# Patient Record
Sex: MALE | Race: WHITE | NOT HISPANIC OR LATINO | URBAN - METROPOLITAN AREA
[De-identification: names, ages, dates, MRNs, and addresses within clinical notes are randomized per-mention and may not be internally consistent; named-entity substitution may affect disease eponyms.]

---

## 2017-12-14 ENCOUNTER — EMERGENCY (EMERGENCY)
Facility: HOSPITAL | Age: 67
LOS: 1 days | Discharge: HOME | End: 2017-12-14
Attending: EMERGENCY MEDICINE | Admitting: EMERGENCY MEDICINE
Payer: MEDICARE

## 2017-12-14 VITALS
OXYGEN SATURATION: 100 % | SYSTOLIC BLOOD PRESSURE: 108 MMHG | HEART RATE: 82 BPM | TEMPERATURE: 98 F | RESPIRATION RATE: 18 BRPM | DIASTOLIC BLOOD PRESSURE: 71 MMHG

## 2017-12-14 VITALS — DIASTOLIC BLOOD PRESSURE: 66 MMHG | HEART RATE: 79 BPM | SYSTOLIC BLOOD PRESSURE: 112 MMHG

## 2017-12-14 DIAGNOSIS — R10.13 EPIGASTRIC PAIN: ICD-10-CM

## 2017-12-14 DIAGNOSIS — Z88.0 ALLERGY STATUS TO PENICILLIN: ICD-10-CM

## 2017-12-14 DIAGNOSIS — R10.11 RIGHT UPPER QUADRANT PAIN: ICD-10-CM

## 2017-12-14 DIAGNOSIS — Z98.890 OTHER SPECIFIED POSTPROCEDURAL STATES: Chronic | ICD-10-CM

## 2017-12-14 DIAGNOSIS — R10.9 UNSPECIFIED ABDOMINAL PAIN: ICD-10-CM

## 2017-12-14 LAB
ALBUMIN SERPL ELPH-MCNC: 3.6 G/DL — SIGNIFICANT CHANGE UP (ref 3.4–5)
ALP SERPL-CCNC: 53 U/L — SIGNIFICANT CHANGE UP (ref 40–120)
ALT FLD-CCNC: 14 U/L — SIGNIFICANT CHANGE UP (ref 12–42)
ANION GAP SERPL CALC-SCNC: 6 MMOL/L — LOW (ref 9–16)
APPEARANCE UR: CLEAR — SIGNIFICANT CHANGE UP
AST SERPL-CCNC: 17 U/L — SIGNIFICANT CHANGE UP (ref 15–37)
BASOPHILS NFR BLD AUTO: 0.2 % — SIGNIFICANT CHANGE UP (ref 0–2)
BILIRUB SERPL-MCNC: 0.9 MG/DL — SIGNIFICANT CHANGE UP (ref 0.2–1.2)
BILIRUB UR-MCNC: NEGATIVE — SIGNIFICANT CHANGE UP
BUN SERPL-MCNC: 13 MG/DL — SIGNIFICANT CHANGE UP (ref 7–23)
CALCIUM SERPL-MCNC: 8.9 MG/DL — SIGNIFICANT CHANGE UP (ref 8.5–10.5)
CHLORIDE SERPL-SCNC: 103 MMOL/L — SIGNIFICANT CHANGE UP (ref 96–108)
CO2 SERPL-SCNC: 28 MMOL/L — SIGNIFICANT CHANGE UP (ref 22–31)
COLOR SPEC: YELLOW — SIGNIFICANT CHANGE UP
CREAT SERPL-MCNC: 0.63 MG/DL — SIGNIFICANT CHANGE UP (ref 0.5–1.3)
DIFF PNL FLD: NEGATIVE — SIGNIFICANT CHANGE UP
EOSINOPHIL NFR BLD AUTO: 0.4 % — SIGNIFICANT CHANGE UP (ref 0–6)
GLUCOSE SERPL-MCNC: 99 MG/DL — SIGNIFICANT CHANGE UP (ref 70–99)
GLUCOSE UR QL: NEGATIVE — SIGNIFICANT CHANGE UP
HCT VFR BLD CALC: 40.1 % — SIGNIFICANT CHANGE UP (ref 39–50)
HGB BLD-MCNC: 13.5 G/DL — SIGNIFICANT CHANGE UP (ref 13–17)
IMM GRANULOCYTES NFR BLD AUTO: 0.2 % — SIGNIFICANT CHANGE UP (ref 0–1.5)
KETONES UR-MCNC: 15 MG/DL
LEUKOCYTE ESTERASE UR-ACNC: NEGATIVE — SIGNIFICANT CHANGE UP
LIDOCAIN IGE QN: 138 U/L — SIGNIFICANT CHANGE UP (ref 73–393)
LYMPHOCYTES # BLD AUTO: 12.1 % — LOW (ref 13–44)
MCHC RBC-ENTMCNC: 30.1 PG — SIGNIFICANT CHANGE UP (ref 27–34)
MCHC RBC-ENTMCNC: 33.7 G/DL — SIGNIFICANT CHANGE UP (ref 32–36)
MCV RBC AUTO: 89.5 FL — SIGNIFICANT CHANGE UP (ref 80–100)
MONOCYTES NFR BLD AUTO: 6.8 % — SIGNIFICANT CHANGE UP (ref 2–14)
NEUTROPHILS NFR BLD AUTO: 80.3 % — HIGH (ref 43–77)
NITRITE UR-MCNC: NEGATIVE — SIGNIFICANT CHANGE UP
PH UR: 6 — SIGNIFICANT CHANGE UP (ref 5–8)
PLATELET # BLD AUTO: 123 K/UL — LOW (ref 150–400)
POTASSIUM SERPL-MCNC: 4.1 MMOL/L — SIGNIFICANT CHANGE UP (ref 3.5–5.3)
POTASSIUM SERPL-SCNC: 4.1 MMOL/L — SIGNIFICANT CHANGE UP (ref 3.5–5.3)
PROT SERPL-MCNC: 6.8 G/DL — SIGNIFICANT CHANGE UP (ref 6.4–8.2)
PROT UR-MCNC: NEGATIVE MG/DL — SIGNIFICANT CHANGE UP
RBC # BLD: 4.48 M/UL — SIGNIFICANT CHANGE UP (ref 4.2–5.8)
RBC # FLD: 13.8 % — SIGNIFICANT CHANGE UP (ref 10.3–16.9)
SODIUM SERPL-SCNC: 137 MMOL/L — SIGNIFICANT CHANGE UP (ref 132–145)
SP GR SPEC: 1.02 — SIGNIFICANT CHANGE UP (ref 1–1.03)
UROBILINOGEN FLD QL: 0.2 E.U./DL — SIGNIFICANT CHANGE UP
WBC # BLD: 8.2 K/UL — SIGNIFICANT CHANGE UP (ref 3.8–10.5)
WBC # FLD AUTO: 8.2 K/UL — SIGNIFICANT CHANGE UP (ref 3.8–10.5)

## 2017-12-14 PROCEDURE — 76700 US EXAM ABDOM COMPLETE: CPT | Mod: 26

## 2017-12-14 PROCEDURE — 99284 EMERGENCY DEPT VISIT MOD MDM: CPT | Mod: GC

## 2017-12-14 RX ORDER — MORPHINE SULFATE 50 MG/1
2 CAPSULE, EXTENDED RELEASE ORAL ONCE
Qty: 0 | Refills: 0 | Status: DISCONTINUED | OUTPATIENT
Start: 2017-12-14 | End: 2017-12-14

## 2017-12-14 RX ORDER — FAMOTIDINE 10 MG/ML
20 INJECTION INTRAVENOUS ONCE
Qty: 0 | Refills: 0 | Status: COMPLETED | OUTPATIENT
Start: 2017-12-14 | End: 2017-12-14

## 2017-12-14 RX ORDER — SODIUM CHLORIDE 9 MG/ML
1000 INJECTION INTRAMUSCULAR; INTRAVENOUS; SUBCUTANEOUS ONCE
Qty: 0 | Refills: 0 | Status: COMPLETED | OUTPATIENT
Start: 2017-12-14 | End: 2017-12-14

## 2017-12-14 RX ADMIN — Medication 30 MILLILITER(S): at 13:03

## 2017-12-14 RX ADMIN — SODIUM CHLORIDE 1000 MILLILITER(S): 9 INJECTION INTRAMUSCULAR; INTRAVENOUS; SUBCUTANEOUS at 13:03

## 2017-12-14 RX ADMIN — FAMOTIDINE 20 MILLIGRAM(S): 10 INJECTION INTRAVENOUS at 13:03

## 2017-12-14 RX ADMIN — MORPHINE SULFATE 2 MILLIGRAM(S): 50 CAPSULE, EXTENDED RELEASE ORAL at 13:53

## 2017-12-14 NOTE — ED ADULT NURSE NOTE - CHPI ED SYMPTOMS NEG
no diarrhea/no chills/no hematuria/no burning urination/no dysuria/no vomiting/no blood in stool/no abdominal distension/no fever

## 2017-12-14 NOTE — ED PROVIDER NOTE - ATTENDING CONTRIBUTION TO CARE
Pt is a 66yo M with a h/o chronic bacterial prostatitis, anxiety, and a PSH of b/l inguinal hernia repairs.  P/w epigastric pain since yesterday.  Pain is constant and denies any alleviating factors.  Similar in past but this is more severe.  Normal BM this morning and passing flatus.    Denies fever, n/v/d, dysuria, hematuria.  PE - agree with Resident exam as above.   A/P - Epigastric pain.  Will perform labs and US to r/o GB/pancreatitis pathology.  Declined pain medication.    W/U performed and benign.  Repeat abd exam again shows a soft abd with no TTP in lower quadrants.  No acute findings on w/u today.  Strict return precautions discussed and strict instructions to f/u with his PCP.

## 2017-12-14 NOTE — ED ADULT NURSE NOTE - OBJECTIVE STATEMENT
Patient is a 66 y/o male presenting to the ED for epigastric pain since yesterday night after drinking smoothie. Patient denies fever/chills, HA, CP, SOB, urinary/bowel s/s. Patient in NAD, resting comfortably, and will continue to monitor.

## 2017-12-14 NOTE — ED PROVIDER NOTE - MEDICAL DECISION MAKING DETAILS
66 yo man w/ upper abd pain, tender on examination. Concerning for cholecystitis vs pancreatitis. Labs, ua, sono. Pt declining pain medication at this time.

## 2017-12-14 NOTE — ED PROVIDER NOTE - CARE PLAN
Principal Discharge DX:	Abdominal pain  Instructions for follow-up, activity and diet:	Call your primary care doctor today or tomorrow to schedule follow up appointment for within the next 3-5 days.  Additionally, follow up with your gastroenterologist.  Continue taking your medications as prescribed.  Return to this Emergency Department if you experience worsening condition or for any other emergency.

## 2017-12-14 NOTE — ED PROVIDER NOTE - PROGRESS NOTE DETAILS
Aleksey Arguelles MD PGY4: Labs, imaging reviewed. Improved w/ medication. Repeat exam non tender. Will discharge with instructions for outpatient GI follow-up.

## 2017-12-14 NOTE — ED PROVIDER NOTE - PLAN OF CARE
Call your primary care doctor today or tomorrow to schedule follow up appointment for within the next 3-5 days.  Additionally, follow up with your gastroenterologist.  Continue taking your medications as prescribed.  Return to this Emergency Department if you experience worsening condition or for any other emergency.

## 2017-12-14 NOTE — ED PROVIDER NOTE - OBJECTIVE STATEMENT
68 yo man w/ h/o anxiety, chronic bacterial prostatitis, s/p b/l inguinal hernia repair p/w abd pain. Developed pain late yesterday evening, upper abd, ?radiation to back, constant, w/o clear aggravating/alleviating factors. Has had similar pain in past but never this severe. Normal BM this am, passing gas. Otherwise, denies fever, n/v/d, dysuria, hematuria.

## 2018-06-05 PROBLEM — F41.9 ANXIETY DISORDER, UNSPECIFIED: Chronic | Status: ACTIVE | Noted: 2017-12-14

## 2018-06-05 PROBLEM — N41.1 CHRONIC PROSTATITIS: Chronic | Status: ACTIVE | Noted: 2017-12-14

## 2018-06-05 PROBLEM — Z00.00 ENCOUNTER FOR PREVENTIVE HEALTH EXAMINATION: Status: ACTIVE | Noted: 2018-06-05

## 2018-08-08 ENCOUNTER — APPOINTMENT (OUTPATIENT)
Dept: OPHTHALMOLOGY | Facility: CLINIC | Age: 68
End: 2018-08-08
Payer: MEDICARE

## 2018-08-08 DIAGNOSIS — H43.813 VITREOUS DEGENERATION, BILATERAL: ICD-10-CM

## 2018-08-08 PROCEDURE — 92020 GONIOSCOPY: CPT

## 2018-08-08 PROCEDURE — 92225: CPT | Mod: LT

## 2018-08-08 PROCEDURE — 99204 OFFICE O/P NEW MOD 45 MIN: CPT

## 2018-08-08 PROCEDURE — 76514 ECHO EXAM OF EYE THICKNESS: CPT

## 2018-08-08 PROCEDURE — 92250 FUNDUS PHOTOGRAPHY W/I&R: CPT

## 2018-11-14 ENCOUNTER — APPOINTMENT (OUTPATIENT)
Dept: OPHTHALMOLOGY | Facility: CLINIC | Age: 68
End: 2018-11-14
Payer: MEDICARE

## 2018-11-14 DIAGNOSIS — H40.1231 LOW-TENSION GLAUCOMA, BILATERAL, MILD STAGE: ICD-10-CM

## 2018-11-14 PROCEDURE — 92083 EXTENDED VISUAL FIELD XM: CPT

## 2018-11-14 PROCEDURE — 92012 INTRM OPH EXAM EST PATIENT: CPT

## 2018-11-14 PROCEDURE — 92133 CPTRZD OPH DX IMG PST SGM ON: CPT

## 2018-12-01 ENCOUNTER — EMERGENCY (EMERGENCY)
Facility: HOSPITAL | Age: 68
LOS: 1 days | Discharge: HOME | End: 2018-12-01
Admitting: EMERGENCY MEDICINE
Payer: MEDICARE

## 2018-12-01 VITALS
DIASTOLIC BLOOD PRESSURE: 80 MMHG | HEART RATE: 69 BPM | SYSTOLIC BLOOD PRESSURE: 124 MMHG | RESPIRATION RATE: 20 BRPM | OXYGEN SATURATION: 95 %

## 2018-12-01 VITALS
OXYGEN SATURATION: 97 % | RESPIRATION RATE: 18 BRPM | DIASTOLIC BLOOD PRESSURE: 67 MMHG | HEART RATE: 64 BPM | SYSTOLIC BLOOD PRESSURE: 116 MMHG | TEMPERATURE: 97 F

## 2018-12-01 DIAGNOSIS — Z88.0 ALLERGY STATUS TO PENICILLIN: ICD-10-CM

## 2018-12-01 DIAGNOSIS — Z98.890 OTHER SPECIFIED POSTPROCEDURAL STATES: Chronic | ICD-10-CM

## 2018-12-01 DIAGNOSIS — R10.9 UNSPECIFIED ABDOMINAL PAIN: ICD-10-CM

## 2018-12-01 DIAGNOSIS — N20.0 CALCULUS OF KIDNEY: ICD-10-CM

## 2018-12-01 LAB
ALBUMIN SERPL ELPH-MCNC: 3.6 G/DL — SIGNIFICANT CHANGE UP (ref 3.4–5)
ALP SERPL-CCNC: 78 U/L — SIGNIFICANT CHANGE UP (ref 40–120)
ALT FLD-CCNC: 12 U/L — SIGNIFICANT CHANGE UP (ref 12–42)
ANION GAP SERPL CALC-SCNC: 3 MMOL/L — LOW (ref 9–16)
APPEARANCE UR: CLEAR — SIGNIFICANT CHANGE UP
AST SERPL-CCNC: 17 U/L — SIGNIFICANT CHANGE UP (ref 15–37)
BASOPHILS NFR BLD AUTO: 0.6 % — SIGNIFICANT CHANGE UP (ref 0–2)
BILIRUB SERPL-MCNC: 0.5 MG/DL — SIGNIFICANT CHANGE UP (ref 0.2–1.2)
BILIRUB UR-MCNC: NEGATIVE — SIGNIFICANT CHANGE UP
BUN SERPL-MCNC: 20 MG/DL — SIGNIFICANT CHANGE UP (ref 7–23)
CALCIUM SERPL-MCNC: 8.9 MG/DL — SIGNIFICANT CHANGE UP (ref 8.5–10.5)
CHLORIDE SERPL-SCNC: 106 MMOL/L — SIGNIFICANT CHANGE UP (ref 96–108)
CO2 SERPL-SCNC: 32 MMOL/L — HIGH (ref 22–31)
COLOR SPEC: YELLOW — SIGNIFICANT CHANGE UP
CREAT SERPL-MCNC: 0.96 MG/DL — SIGNIFICANT CHANGE UP (ref 0.5–1.3)
DIFF PNL FLD: ABNORMAL
EOSINOPHIL NFR BLD AUTO: 3.8 % — SIGNIFICANT CHANGE UP (ref 0–6)
GLUCOSE SERPL-MCNC: 115 MG/DL — HIGH (ref 70–99)
GLUCOSE UR QL: NEGATIVE — SIGNIFICANT CHANGE UP
HCT VFR BLD CALC: 40.2 % — SIGNIFICANT CHANGE UP (ref 39–50)
HGB BLD-MCNC: 13.2 G/DL — SIGNIFICANT CHANGE UP (ref 13–17)
IMM GRANULOCYTES NFR BLD AUTO: 0.2 % — SIGNIFICANT CHANGE UP (ref 0–1.5)
KETONES UR-MCNC: NEGATIVE — SIGNIFICANT CHANGE UP
LACTATE SERPL-SCNC: 1 MMOL/L — SIGNIFICANT CHANGE UP (ref 0.4–2)
LEUKOCYTE ESTERASE UR-ACNC: ABNORMAL
LIDOCAIN IGE QN: 199 U/L — SIGNIFICANT CHANGE UP (ref 73–393)
LYMPHOCYTES # BLD AUTO: 41.1 % — SIGNIFICANT CHANGE UP (ref 13–44)
MCHC RBC-ENTMCNC: 29.5 PG — SIGNIFICANT CHANGE UP (ref 27–34)
MCHC RBC-ENTMCNC: 32.8 G/DL — SIGNIFICANT CHANGE UP (ref 32–36)
MCV RBC AUTO: 89.9 FL — SIGNIFICANT CHANGE UP (ref 80–100)
MONOCYTES NFR BLD AUTO: 9.1 % — SIGNIFICANT CHANGE UP (ref 2–14)
NEUTROPHILS NFR BLD AUTO: 45.2 % — SIGNIFICANT CHANGE UP (ref 43–77)
NITRITE UR-MCNC: NEGATIVE — SIGNIFICANT CHANGE UP
PH UR: 6 — SIGNIFICANT CHANGE UP (ref 5–8)
PLATELET # BLD AUTO: 120 K/UL — LOW (ref 150–400)
POTASSIUM SERPL-MCNC: 3.8 MMOL/L — SIGNIFICANT CHANGE UP (ref 3.5–5.3)
POTASSIUM SERPL-SCNC: 3.8 MMOL/L — SIGNIFICANT CHANGE UP (ref 3.5–5.3)
PROT SERPL-MCNC: 6.9 G/DL — SIGNIFICANT CHANGE UP (ref 6.4–8.2)
PROT UR-MCNC: NEGATIVE MG/DL — SIGNIFICANT CHANGE UP
RBC # BLD: 4.47 M/UL — SIGNIFICANT CHANGE UP (ref 4.2–5.8)
RBC # FLD: 14.2 % — SIGNIFICANT CHANGE UP (ref 10.3–14.5)
SODIUM SERPL-SCNC: 141 MMOL/L — SIGNIFICANT CHANGE UP (ref 132–145)
SP GR SPEC: 1.02 — SIGNIFICANT CHANGE UP (ref 1–1.03)
TROPONIN I SERPL-MCNC: <0.017 NG/ML — LOW (ref 0.02–0.06)
UROBILINOGEN FLD QL: 0.2 E.U./DL — SIGNIFICANT CHANGE UP
WBC # BLD: 5.3 K/UL — SIGNIFICANT CHANGE UP (ref 3.8–10.5)
WBC # FLD AUTO: 5.3 K/UL — SIGNIFICANT CHANGE UP (ref 3.8–10.5)

## 2018-12-01 PROCEDURE — 74176 CT ABD & PELVIS W/O CONTRAST: CPT | Mod: 26

## 2018-12-01 PROCEDURE — 93010 ELECTROCARDIOGRAM REPORT: CPT

## 2018-12-01 PROCEDURE — 99284 EMERGENCY DEPT VISIT MOD MDM: CPT | Mod: 25

## 2018-12-01 RX ORDER — OXYCODONE AND ACETAMINOPHEN 5; 325 MG/1; MG/1
1 TABLET ORAL ONCE
Qty: 0 | Refills: 0 | Status: DISCONTINUED | OUTPATIENT
Start: 2018-12-01 | End: 2018-12-01

## 2018-12-01 RX ORDER — ONDANSETRON 8 MG/1
4 TABLET, FILM COATED ORAL ONCE
Qty: 0 | Refills: 0 | Status: COMPLETED | OUTPATIENT
Start: 2018-12-01 | End: 2018-12-01

## 2018-12-01 RX ORDER — SODIUM CHLORIDE 9 MG/ML
1000 INJECTION INTRAMUSCULAR; INTRAVENOUS; SUBCUTANEOUS ONCE
Qty: 0 | Refills: 0 | Status: COMPLETED | OUTPATIENT
Start: 2018-12-01 | End: 2018-12-01

## 2018-12-01 RX ORDER — CEFUROXIME AXETIL 250 MG
1 TABLET ORAL
Qty: 14 | Refills: 0
Start: 2018-12-01 | End: 2018-12-07

## 2018-12-01 RX ORDER — TAMSULOSIN HYDROCHLORIDE 0.4 MG/1
1 CAPSULE ORAL
Qty: 7 | Refills: 0 | OUTPATIENT
Start: 2018-12-01 | End: 2018-12-07

## 2018-12-01 RX ORDER — CEFTRIAXONE 500 MG/1
1 INJECTION, POWDER, FOR SOLUTION INTRAMUSCULAR; INTRAVENOUS ONCE
Qty: 0 | Refills: 0 | Status: COMPLETED | OUTPATIENT
Start: 2018-12-01 | End: 2018-12-01

## 2018-12-01 RX ORDER — ONDANSETRON 8 MG/1
1 TABLET, FILM COATED ORAL
Qty: 9 | Refills: 0
Start: 2018-12-01 | End: 2018-12-03

## 2018-12-01 RX ORDER — CEFUROXIME AXETIL 250 MG
1 TABLET ORAL
Qty: 14 | Refills: 0 | OUTPATIENT
Start: 2018-12-01 | End: 2018-12-07

## 2018-12-01 RX ORDER — MORPHINE SULFATE 50 MG/1
4 CAPSULE, EXTENDED RELEASE ORAL ONCE
Qty: 0 | Refills: 0 | Status: DISCONTINUED | OUTPATIENT
Start: 2018-12-01 | End: 2018-12-01

## 2018-12-01 RX ORDER — TAMSULOSIN HYDROCHLORIDE 0.4 MG/1
1 CAPSULE ORAL
Qty: 7 | Refills: 0
Start: 2018-12-01 | End: 2018-12-07

## 2018-12-01 RX ORDER — ONDANSETRON 8 MG/1
1 TABLET, FILM COATED ORAL
Qty: 9 | Refills: 0 | OUTPATIENT
Start: 2018-12-01 | End: 2018-12-03

## 2018-12-01 RX ORDER — TAMSULOSIN HYDROCHLORIDE 0.4 MG/1
0.4 CAPSULE ORAL AT BEDTIME
Qty: 0 | Refills: 0 | Status: DISCONTINUED | OUTPATIENT
Start: 2018-12-01 | End: 2018-12-05

## 2018-12-01 RX ORDER — KETOROLAC TROMETHAMINE 30 MG/ML
15 SYRINGE (ML) INJECTION ONCE
Qty: 0 | Refills: 0 | Status: DISCONTINUED | OUTPATIENT
Start: 2018-12-01 | End: 2018-12-01

## 2018-12-01 RX ADMIN — OXYCODONE AND ACETAMINOPHEN 1 TABLET(S): 5; 325 TABLET ORAL at 04:57

## 2018-12-01 RX ADMIN — TAMSULOSIN HYDROCHLORIDE 0.4 MILLIGRAM(S): 0.4 CAPSULE ORAL at 04:53

## 2018-12-01 RX ADMIN — SODIUM CHLORIDE 1000 MILLILITER(S): 9 INJECTION INTRAMUSCULAR; INTRAVENOUS; SUBCUTANEOUS at 02:27

## 2018-12-01 RX ADMIN — OXYCODONE AND ACETAMINOPHEN 1 TABLET(S): 5; 325 TABLET ORAL at 05:34

## 2018-12-01 RX ADMIN — MORPHINE SULFATE 4 MILLIGRAM(S): 50 CAPSULE, EXTENDED RELEASE ORAL at 02:27

## 2018-12-01 RX ADMIN — SODIUM CHLORIDE 1000 MILLILITER(S): 9 INJECTION INTRAMUSCULAR; INTRAVENOUS; SUBCUTANEOUS at 04:25

## 2018-12-01 RX ADMIN — CEFTRIAXONE 100 GRAM(S): 500 INJECTION, POWDER, FOR SOLUTION INTRAMUSCULAR; INTRAVENOUS at 04:53

## 2018-12-01 RX ADMIN — SODIUM CHLORIDE 1000 MILLILITER(S): 9 INJECTION INTRAMUSCULAR; INTRAVENOUS; SUBCUTANEOUS at 04:26

## 2018-12-01 RX ADMIN — ONDANSETRON 4 MILLIGRAM(S): 8 TABLET, FILM COATED ORAL at 02:27

## 2018-12-01 RX ADMIN — MORPHINE SULFATE 4 MILLIGRAM(S): 50 CAPSULE, EXTENDED RELEASE ORAL at 07:09

## 2018-12-01 RX ADMIN — Medication 15 MILLIGRAM(S): at 02:26

## 2018-12-01 NOTE — ED ADULT NURSE REASSESSMENT NOTE - NS ED NURSE REASSESS COMMENT FT1
Pt A&Ox3 at this time. Pt c.o 5/10 pain in right flank at this time. Pt pending medications at this time. PA notified of pain. Will continue to monitor. Pt received from ER RAYMUNDO Pang. Pt A&Ox3 at this time. Pt c.o 5/10 pain in right flank at this time. Pt pending medications at this time. PA notified of pain. Will continue to monitor.

## 2018-12-01 NOTE — ED PROVIDER NOTE - OBJECTIVE STATEMENT
68 year old male with h/o kidney stone, BPH, HLD presents with right flank pain x 3 hours. patient reports pain woke him from sleeping. associated with  mild nausea. no dysuria/hematuria noted.  no h/o similar symptoms in the past.   Denies fever, chills, V/D/C, melena, hematochezia, hematuria, change in urinary/bowel function, dysuria, d/c, flank pain, HA, dizziness, focal weakness, CP, SOB, palpitations, cough, and malaise.

## 2018-12-01 NOTE — ED PROVIDER NOTE - PROGRESS NOTE DETAILS
labs reviewed. prelim CT reviewed. 0.3cm stone noted. discussed with patient. will give NS #2 and flomax percocet for pain, patient reports did not alleviate pain. requesting morphine. discussed trying to find oral pain medication to dc patient on. patient reports he is not ready to leave yet. discussed reasons for urology admission which at this time he does not meet JENNIFER Taylor endorsed patient to day shift pending re-evaluation and disposition. patient signed out to me pending re-evaluation. Patient asking to be discharged, he states he wants to be in the comfort of his own home, pain controlled, tolerating PO, discussed CT findings/UA/lab results. will rx percocet for pain, patient states he is unable to take nsaids due to gastric ulcers in the past, patient aware that percocet can cause constipation, advised high fiber diet and metamucil/benefiber otc, advised hydration, rx ceftin (received IV ceftriaxone in ED), rx flomax, he has a urologist he will follow up with - advised f/u in 1-2 days, strict return precautions discussed including uncontrolled pain, fever, vomiting or any concerns, all questions answered, patient agrees with plan, will dc home with josé manuel.

## 2018-12-01 NOTE — ED PROVIDER NOTE - MEDICAL DECISION MAKING DETAILS
right flank pain with nausea. will get labs/UA/CT right flank pain with nausea. will get labs/UA/CT  likely stone

## 2018-12-01 NOTE — ED PROVIDER NOTE - NS ED ROS FT
· CONSTITUTIONAL: no fever and no chills.  · CARDIOVASCULAR: normal rate and rhythm, no chest pain and no edema.  · RESPIRATORY: no chest pain, no cough, and no shortness of breath.  · GASTROINTESTINAL: no abdominal pain, no bloating, no constipation, no diarrhea, no nausea and no vomiting. +right flank pain  · MUSCULOSKELETAL: no back pain, no musculoskeletal pain, no neck pain, and no weakness.  · SKIN: no abrasions, no jaundice, no lesions, no pruritis, and no rashes.  · NEURO: no loss of consciousness, no gait abnormality, no headache, no sensory deficits, and no weakness.  · PSYCHIATRIC: no known mental health issues.

## 2018-12-01 NOTE — ED ADULT TRIAGE NOTE - CHIEF COMPLAINT QUOTE
Patient ambulating with steady gait complaining of right sided flank/right lower quadrant pain that feels like a pulling sensation, woke patient out of his sleep around 12Am; patient denies any recent illness, nausea/vomiting/diarrhea, fevers/chills

## 2018-12-01 NOTE — ED PROVIDER NOTE - NSFOLLOWUPINSTRUCTIONS_ED_ALL_ED_FT
Please follow up with your urologist this week  If you want to see another urologist, please follow up with the above urologist  Drink plenty of fluids  Take Percocet as prescribed for pain  Take Flomax as prescribed  Take Zofran as needed or nausea.    RETURN TO THE EMERGENCY DEPARTMENT FOR WORSENING PAIN, FEVER, VOMITING, OR ANY CONCERNS.

## 2018-12-01 NOTE — ED PROVIDER NOTE - PHYSICAL EXAMINATION
VITAL SIGNS: I have reviewed nursing notes and confirm.  CONSTITUTIONAL: Well-developed; well-nourished; in no acute distress.  SKIN: Skin is warm and dry, no acute rash.  HEAD: Normocephalic; atraumatic.  EYES: PERRL, EOM intact; conjunctiva and sclera clear.  ENT: No nasal discharge; airway clear.  NECK: Supple; non tender.  CARD: S1, S2 normal; no murmurs, gallops, or rubs. Regular rate and rhythm.  RESP: No wheezes, rales or rhonchi.  ABD: Normal bowel sounds; soft; non-distended; non-tender;  no palpable or pulsating mass; no hepatosplenomegaly. no CVA tenderness  EXT: Normal ROM. No clubbing, cyanosis or edema.  NEURO: Alert, oriented. Grossly unremarkable.  PSYCH: Cooperative, appropriate.

## 2018-12-01 NOTE — ED PROVIDER NOTE - CARE PROVIDER_API CALL
Bebo Byrd (MD), Urology  [address], [place] [facility]  Phone: [phone]  Fax: [phone]

## 2018-12-02 LAB
CULTURE RESULTS: SIGNIFICANT CHANGE UP
SPECIMEN SOURCE: SIGNIFICANT CHANGE UP

## 2018-12-03 ENCOUNTER — APPOINTMENT (OUTPATIENT)
Dept: OTOLARYNGOLOGY | Facility: CLINIC | Age: 68
End: 2018-12-03
Payer: MEDICARE

## 2018-12-03 VITALS
WEIGHT: 140 LBS | OXYGEN SATURATION: 98 % | BODY MASS INDEX: 23.32 KG/M2 | HEIGHT: 65 IN | DIASTOLIC BLOOD PRESSURE: 61 MMHG | HEART RATE: 59 BPM | RESPIRATION RATE: 15 BRPM | SYSTOLIC BLOOD PRESSURE: 94 MMHG | TEMPERATURE: 97.7 F

## 2018-12-03 DIAGNOSIS — H25.813 COMBINED FORMS OF AGE-RELATED CATARACT, BILATERAL: ICD-10-CM

## 2018-12-03 DIAGNOSIS — G47.33 OBSTRUCTIVE SLEEP APNEA (ADULT) (PEDIATRIC): ICD-10-CM

## 2018-12-03 DIAGNOSIS — G47.19 OTHER HYPERSOMNIA: ICD-10-CM

## 2018-12-03 PROCEDURE — 31575 DIAGNOSTIC LARYNGOSCOPY: CPT

## 2018-12-03 PROCEDURE — 99203 OFFICE O/P NEW LOW 30 MIN: CPT | Mod: 25

## 2018-12-17 ENCOUNTER — APPOINTMENT (OUTPATIENT)
Dept: OPHTHALMOLOGY | Facility: CLINIC | Age: 68
End: 2018-12-17

## 2018-12-18 ENCOUNTER — INBOUND DOCUMENT (OUTPATIENT)
Age: 68
End: 2018-12-18

## 2019-01-07 ENCOUNTER — APPOINTMENT (OUTPATIENT)
Dept: OPHTHALMOLOGY | Facility: CLINIC | Age: 69
End: 2019-01-07

## 2019-02-14 ENCOUNTER — APPOINTMENT (OUTPATIENT)
Dept: OPHTHALMOLOGY | Facility: CLINIC | Age: 69
End: 2019-02-14

## 2019-08-09 ENCOUNTER — APPOINTMENT (OUTPATIENT)
Dept: UROLOGY | Facility: CLINIC | Age: 69
End: 2019-08-09
Payer: MEDICARE

## 2019-08-09 VITALS
SYSTOLIC BLOOD PRESSURE: 111 MMHG | HEIGHT: 65 IN | BODY MASS INDEX: 21.66 KG/M2 | TEMPERATURE: 97.7 F | DIASTOLIC BLOOD PRESSURE: 71 MMHG | HEART RATE: 85 BPM | OXYGEN SATURATION: 90 % | WEIGHT: 130 LBS

## 2019-08-09 DIAGNOSIS — Z78.9 OTHER SPECIFIED HEALTH STATUS: ICD-10-CM

## 2019-08-09 DIAGNOSIS — Z80.42 FAMILY HISTORY OF MALIGNANT NEOPLASM OF PROSTATE: ICD-10-CM

## 2019-08-09 PROCEDURE — 51798 US URINE CAPACITY MEASURE: CPT | Mod: 59

## 2019-08-09 PROCEDURE — 51741 ELECTRO-UROFLOWMETRY FIRST: CPT

## 2019-08-09 PROCEDURE — 99204 OFFICE O/P NEW MOD 45 MIN: CPT | Mod: 25

## 2019-08-09 NOTE — ASSESSMENT
[FreeTextEntry1] : 67yo male with BPH with incomplete bladder emptying\par Residuals are high > 300 \par Slow flow on Uroflow\par Likely prostate obstruction\par Will likely need bladder outlet procedure\par Reviewed types of procedures briefly\par Check UA, urine culture, PSA\par F/u for cystoscopy

## 2019-08-09 NOTE — PHYSICAL EXAM
[General Appearance - Well Developed] : well developed [General Appearance - Well Nourished] : well nourished [Normal Appearance] : normal appearance [Well Groomed] : well groomed [General Appearance - In No Acute Distress] : no acute distress [Edema] : no peripheral edema [Abdomen Tenderness] : non-tender [Abdomen Soft] : soft [Costovertebral Angle Tenderness] : no ~M costovertebral angle tenderness [Prostate Tenderness] : the prostate was not tender [Prostate Size ___ gm] : prostate size [unfilled] gm [No Prostate Nodules] : no prostate nodules [Normal Station and Gait] : the gait and station were normal for the patient's age [] : no rash [No Focal Deficits] : no focal deficits [Affect] : the affect was normal [Oriented To Time, Place, And Person] : oriented to person, place, and time [Not Anxious] : not anxious [Mood] : the mood was normal

## 2019-08-09 NOTE — LETTER BODY
[Courtesy Letter:] : I had the pleasure of seeing your patient, [unfilled], in my office today. [Dear  ___] : Dear  [unfilled], [Please see my note below.] : Please see my note below. [Consult Closing:] : Thank you very much for allowing me to participate in the care of this patient.  If you have any questions, please do not hesitate to contact me. [Sincerely,] : Sincerely, [FreeTextEntry2] : Harjit Restrepo MD\par [address]\[place] [address] [FreeTextEntry3] : Bebo Byrd MD\par Urologic Oncology\par Department of Urology\par [facility]\par \par [employer] [facility] \par \par

## 2019-08-09 NOTE — HISTORY OF PRESENT ILLNESS
[FreeTextEntry1] : This is a 67yo male here for initial consultation of BPH with obstruction. He has had this problem for many years, starting with episode of acute retention in 2004, 3 months after a hernia repair. He thinks his symptoms may be related to mesh. Also seen in ER 12/2018 for right 3mm distal ureteral stone which he thinks passed. At the time, he switched cardura for tamsulosin. H/o incomplete emptying with residuals around 300cc. Also with h/o recurrent Enteroccous infections. Currently without symptoms. CT in 12/2018 also shows bladder stone. Currently no dysuria, hematuria or flank pain. AUASS = 24. PSA around 2.5 last year. His brother had prostate cancer.  [Urinary Retention] : urinary retention [None] : None

## 2019-08-09 NOTE — REVIEW OF SYSTEMS
[Abdominal Pain] : abdominal pain [see HPI] : see HPI [Joint Pain] : joint pain [Anxiety] : anxiety [Negative] : Heme/Lymph

## 2019-08-12 LAB
APPEARANCE: ABNORMAL
BACTERIA UR CULT: NORMAL
BACTERIA: NEGATIVE
BILIRUBIN URINE: NEGATIVE
BLOOD URINE: NEGATIVE
CALCIUM OXALATE CRYSTALS: ABNORMAL
COLOR: ABNORMAL
GLUCOSE QUALITATIVE U: NEGATIVE
HYALINE CASTS: 1 /LPF
KETONES URINE: NEGATIVE
LEUKOCYTE ESTERASE URINE: ABNORMAL
MICROSCOPIC-UA: NORMAL
NITRITE URINE: NEGATIVE
PH URINE: 6.5
PROTEIN URINE: NORMAL
PSA SERPL-MCNC: 2.24 NG/ML
RED BLOOD CELLS URINE: 3 /HPF
SPECIFIC GRAVITY URINE: 1.02
SQUAMOUS EPITHELIAL CELLS: 0 /HPF
UROBILINOGEN URINE: NORMAL
WHITE BLOOD CELLS URINE: 124 /HPF

## 2019-08-13 ENCOUNTER — RESULT REVIEW (OUTPATIENT)
Age: 69
End: 2019-08-13

## 2019-08-26 ENCOUNTER — APPOINTMENT (OUTPATIENT)
Dept: UROLOGY | Facility: CLINIC | Age: 69
End: 2019-08-26
Payer: MEDICARE

## 2019-08-26 VITALS — TEMPERATURE: 97.6 F | SYSTOLIC BLOOD PRESSURE: 117 MMHG | DIASTOLIC BLOOD PRESSURE: 82 MMHG | HEART RATE: 104 BPM

## 2019-08-26 DIAGNOSIS — N21.0 CALCULUS IN BLADDER: ICD-10-CM

## 2019-08-26 PROCEDURE — 52000 CYSTOURETHROSCOPY: CPT

## 2019-08-26 RX ORDER — TAMSULOSIN HYDROCHLORIDE 0.4 MG/1
0.4 CAPSULE ORAL
Qty: 60 | Refills: 5 | Status: DISCONTINUED | COMMUNITY
Start: 1900-01-01 | End: 2019-08-26

## 2019-09-19 ENCOUNTER — MEDICATION RENEWAL (OUTPATIENT)
Age: 69
End: 2019-09-19

## 2019-10-04 ENCOUNTER — APPOINTMENT (OUTPATIENT)
Dept: UROLOGY | Facility: CLINIC | Age: 69
End: 2019-10-04
Payer: MEDICARE

## 2019-10-04 ENCOUNTER — LABORATORY RESULT (OUTPATIENT)
Age: 69
End: 2019-10-04

## 2019-10-04 VITALS
WEIGHT: 130 LBS | HEIGHT: 65 IN | BODY MASS INDEX: 21.66 KG/M2 | SYSTOLIC BLOOD PRESSURE: 112 MMHG | HEART RATE: 90 BPM | DIASTOLIC BLOOD PRESSURE: 82 MMHG | OXYGEN SATURATION: 100 %

## 2019-10-04 PROCEDURE — 51702 INSERT TEMP BLADDER CATH: CPT

## 2019-10-04 PROCEDURE — 99213 OFFICE O/P EST LOW 20 MIN: CPT | Mod: 25

## 2019-10-04 PROCEDURE — 51798 US URINE CAPACITY MEASURE: CPT

## 2019-10-05 NOTE — HISTORY OF PRESENT ILLNESS
[FreeTextEntry1] : This is a 67yo male here for initial consultation of BPH with obstruction. He has had this problem for many years, starting with episode of acute retention in 2004, 3 months after a hernia repair. He thinks his symptoms may be related to mesh. Also seen in ER 12/2018 for right 3mm distal ureteral stone which he thinks passed. At the time, he switched cardura for tamsulosin. H/o incomplete emptying with residuals around 300cc. Also with h/o recurrent Enteroccous infections. Currently without symptoms. CT in 12/2018 also shows bladder stone. Currently no dysuria, hematuria or flank pain. AUASS = 24. PSA around 2.5 last year. His brother had prostate cancer. \par \par 10/04/19 Here for f/u. C/o worsening retention symptoms, difficulty urinating. Went to outside ER where urine culture was reportedly normal, no other tests performed. He is concerned about developing obstructive uropathy. No recent creatinine available.  [None] : None [Urinary Retention] : urinary retention

## 2019-10-05 NOTE — PHYSICAL EXAM
[General Appearance - Well Developed] : well developed [Normal Appearance] : normal appearance [General Appearance - Well Nourished] : well nourished [General Appearance - In No Acute Distress] : no acute distress [Well Groomed] : well groomed [Costovertebral Angle Tenderness] : no ~M costovertebral angle tenderness [Abdomen Tenderness] : non-tender [Abdomen Soft] : soft [FreeTextEntry1] : PVR = 400cc. Deras catheter placed [Affect] : the affect was normal [Oriented To Time, Place, And Person] : oriented to person, place, and time [Mood] : the mood was normal [Not Anxious] : not anxious [Normal Station and Gait] : the gait and station were normal for the patient's age [No Focal Deficits] : no focal deficits

## 2019-10-05 NOTE — ASSESSMENT
[FreeTextEntry1] : 68yo male with BPH with incomplete bladder emptying\par Residuals are high > 300 \par Slow flow on Uroflow\par Cystoscopy revealed about 80 gm prostate\par mcclellan catheter placed today for urinary retention\par F/u next week for voiding trial\par He is interested in scheduling Greenlight PVP for prostate obstruction\par Will schedule for mid October

## 2019-10-05 NOTE — LETTER BODY
[Courtesy Letter:] : I had the pleasure of seeing your patient, [unfilled], in my office today. [Dear  ___] : Dear  [unfilled], [Please see my note below.] : Please see my note below. [FreeTextEntry2] : Harjit Restrepo MD\par [address]\[place] [address] [Consult Closing:] : Thank you very much for allowing me to participate in the care of this patient.  If you have any questions, please do not hesitate to contact me. [Sincerely,] : Sincerely, [FreeTextEntry3] : Bebo Byrd MD\par Urologic Oncology\par Department of Urology\par [facility]\par \par [employer] [facility] \par \par

## 2019-10-06 ENCOUNTER — FORM ENCOUNTER (OUTPATIENT)
Age: 69
End: 2019-10-06

## 2019-10-07 ENCOUNTER — APPOINTMENT (OUTPATIENT)
Dept: UROLOGY | Facility: CLINIC | Age: 69
End: 2019-10-07
Payer: MEDICARE

## 2019-10-07 ENCOUNTER — OUTPATIENT (OUTPATIENT)
Dept: OUTPATIENT SERVICES | Facility: HOSPITAL | Age: 69
LOS: 1 days | End: 2019-10-07
Payer: MEDICARE

## 2019-10-07 VITALS — SYSTOLIC BLOOD PRESSURE: 116 MMHG | TEMPERATURE: 97.6 F | DIASTOLIC BLOOD PRESSURE: 80 MMHG | HEART RATE: 106 BPM

## 2019-10-07 DIAGNOSIS — R33.9 RETENTION OF URINE, UNSPECIFIED: ICD-10-CM

## 2019-10-07 DIAGNOSIS — Z98.890 OTHER SPECIFIED POSTPROCEDURAL STATES: Chronic | ICD-10-CM

## 2019-10-07 LAB
ANION GAP SERPL CALC-SCNC: 12 MMOL/L
BACTERIA UR CULT: NORMAL
BUN SERPL-MCNC: 12 MG/DL
CALCIUM SERPL-MCNC: 8.7 MG/DL
CHLORIDE SERPL-SCNC: 97 MMOL/L
CO2 SERPL-SCNC: 27 MMOL/L
CREAT SERPL-MCNC: 0.77 MG/DL
GLUCOSE SERPL-MCNC: 111 MG/DL
POTASSIUM SERPL-SCNC: 4.4 MMOL/L
SODIUM SERPL-SCNC: 136 MMOL/L

## 2019-10-07 PROCEDURE — 71046 X-RAY EXAM CHEST 2 VIEWS: CPT | Mod: 26

## 2019-10-07 PROCEDURE — 51702 INSERT TEMP BLADDER CATH: CPT

## 2019-10-07 PROCEDURE — 71046 X-RAY EXAM CHEST 2 VIEWS: CPT

## 2019-10-07 PROCEDURE — 99214 OFFICE O/P EST MOD 30 MIN: CPT | Mod: 25

## 2019-10-07 RX ORDER — LEVOFLOXACIN 500 MG/1
500 TABLET, FILM COATED ORAL DAILY
Qty: 0 | Refills: 0 | Status: COMPLETED | OUTPATIENT
Start: 2019-10-07

## 2019-10-07 RX ADMIN — LEVOFLOXACIN 1 MG: 500 TABLET ORAL at 00:00

## 2019-10-08 LAB
ALBUMIN SERPL ELPH-MCNC: 4.2 G/DL
ALP BLD-CCNC: 79 U/L
ALT SERPL-CCNC: 11 U/L
ANION GAP SERPL CALC-SCNC: 12 MMOL/L
APPEARANCE: CLEAR
APTT BLD: 30.1 SEC
AST SERPL-CCNC: 21 U/L
BACTERIA: NEGATIVE
BASOPHILS # BLD AUTO: 0.01 K/UL
BASOPHILS NFR BLD AUTO: 0.2 %
BILIRUB SERPL-MCNC: 0.3 MG/DL
BILIRUBIN URINE: NEGATIVE
BLOOD URINE: NEGATIVE
BUN SERPL-MCNC: 13 MG/DL
CALCIUM SERPL-MCNC: 9 MG/DL
CHLORIDE SERPL-SCNC: 93 MMOL/L
CO2 SERPL-SCNC: 28 MMOL/L
COLOR: NORMAL
CREAT SERPL-MCNC: 0.78 MG/DL
EOSINOPHIL # BLD AUTO: 0.09 K/UL
EOSINOPHIL NFR BLD AUTO: 1.5 %
GLUCOSE QUALITATIVE U: NEGATIVE
GLUCOSE SERPL-MCNC: 112 MG/DL
HCT VFR BLD CALC: 44.4 %
HGB BLD-MCNC: 14.5 G/DL
HYALINE CASTS: 1 /LPF
IMM GRANULOCYTES NFR BLD AUTO: 0.3 %
INR PPP: 0.99 RATIO
KETONES URINE: NEGATIVE
LEUKOCYTE ESTERASE URINE: ABNORMAL
LYMPHOCYTES # BLD AUTO: 1.55 K/UL
LYMPHOCYTES NFR BLD AUTO: 25 %
MAN DIFF?: NORMAL
MCHC RBC-ENTMCNC: 30.1 PG
MCHC RBC-ENTMCNC: 32.7 GM/DL
MCV RBC AUTO: 92.1 FL
MICROSCOPIC-UA: NORMAL
MONOCYTES # BLD AUTO: 0.67 K/UL
MONOCYTES NFR BLD AUTO: 10.8 %
NEUTROPHILS # BLD AUTO: 3.85 K/UL
NEUTROPHILS NFR BLD AUTO: 62.2 %
NITRITE URINE: NEGATIVE
PH URINE: 6
PLATELET # BLD AUTO: 187 K/UL
POTASSIUM SERPL-SCNC: 4.8 MMOL/L
PROT SERPL-MCNC: 7 G/DL
PROTEIN URINE: NORMAL
PT BLD: 11.3 SEC
RBC # BLD: 4.82 M/UL
RBC # FLD: 13.9 %
RED BLOOD CELLS URINE: 3 /HPF
SODIUM SERPL-SCNC: 133 MMOL/L
SPECIFIC GRAVITY URINE: 1.01
SQUAMOUS EPITHELIAL CELLS: 2 /HPF
UROBILINOGEN URINE: NORMAL
WBC # FLD AUTO: 6.19 K/UL
WHITE BLOOD CELLS URINE: 7 /HPF

## 2019-10-08 NOTE — HISTORY OF PRESENT ILLNESS
[None] : None [FreeTextEntry1] : This is a 67yo male here for initial consultation of BPH with obstruction. He has had this problem for many years, starting with episode of acute retention in 2004, 3 months after a hernia repair. He thinks his symptoms may be related to mesh. Also seen in ER 12/2018 for right 3mm distal ureteral stone which he thinks passed. At the time, he switched cardura for tamsulosin. H/o incomplete emptying with residuals around 300cc. Also with h/o recurrent Enteroccous infections. Currently without symptoms. CT in 12/2018 also shows bladder stone. Currently no dysuria, hematuria or flank pain. AUASS = 24. PSA around 2.5 last year. His brother had prostate cancer. \par \par 10/04/19 Here for f/u. C/o worsening retention symptoms, difficulty urinating. Went to outside ER where urine culture was reportedly normal, no other tests performed. He is concerned about developing obstructive uropathy. No recent creatinine available. \par \par 10/7/19 Here for trial of void. Serum creatinine and urine culture were normal last week.  [Urinary Retention] : urinary retention [Edema] : ~T edema was not present [Flank Pain] : no flank pain [Abdominal Pain] : no abdominal pain [Fever] : no fever [Weight Loss] : no recent ~M [unfilled] weight loss [Fatigue] : no fatigue [Nausea] : no nausea [Anorexia] : no anorexia

## 2019-10-08 NOTE — PHYSICAL EXAM
[General Appearance - Well Developed] : well developed [General Appearance - Well Nourished] : well nourished [Normal Appearance] : normal appearance [Well Groomed] : well groomed [General Appearance - In No Acute Distress] : no acute distress [Urethral Meatus] : meatus normal [Penis Abnormality] : normal circumcised penis [Urinary Bladder Findings] : the bladder was normal on palpation [Scrotum] : the scrotum was normal [Abdomen Soft] : soft [Abdomen Tenderness] : non-tender [Costovertebral Angle Tenderness] : no ~M costovertebral angle tenderness [Affect] : the affect was normal [Oriented To Time, Place, And Person] : oriented to person, place, and time [FreeTextEntry1] : Failed TOV. Deras replaced [Mood] : the mood was normal [Not Anxious] : not anxious [Normal Station and Gait] : the gait and station were normal for the patient's age [No Focal Deficits] : no focal deficits

## 2019-10-08 NOTE — ASSESSMENT
[FreeTextEntry1] : 68yo male with BPH with incomplete bladder emptying\par Residuals are high > 300 \par Slow flow on Uroflow\par Cystoscopy revealed about 80 gm prostate\par mcclellan catheter replaced\par He is interested in scheduling Greenlight PVP for prostate obstruction\par Medical clearance\par

## 2019-10-08 NOTE — REVIEW OF SYSTEMS
[see HPI] : see HPI [Negative] : Psychiatric [Abdominal Pain] : abdominal pain [Fever] : no fever [Chills] : no chills

## 2019-10-08 NOTE — LETTER BODY
[Dear  ___] : Dear  [unfilled], [Courtesy Letter:] : I had the pleasure of seeing your patient, [unfilled], in my office today. [Please see my note below.] : Please see my note below. [Consult Closing:] : Thank you very much for allowing me to participate in the care of this patient.  If you have any questions, please do not hesitate to contact me. [FreeTextEntry2] : Harjit Restrepo MD\par [address]\[place] [address] [Sincerely,] : Sincerely, [FreeTextEntry3] : Bebo Byrd MD\par Urologic Oncology\par Department of Urology\par [facility]\par \par [employer] [facility] \par \par

## 2019-10-09 LAB — BACTERIA UR CULT: NORMAL

## 2019-10-10 ENCOUNTER — EMERGENCY (EMERGENCY)
Facility: HOSPITAL | Age: 69
LOS: 1 days | Discharge: HOME | End: 2019-10-10
Attending: EMERGENCY MEDICINE | Admitting: EMERGENCY MEDICINE
Payer: MEDICARE

## 2019-10-10 VITALS
HEART RATE: 88 BPM | WEIGHT: 125 LBS | DIASTOLIC BLOOD PRESSURE: 85 MMHG | SYSTOLIC BLOOD PRESSURE: 135 MMHG | HEIGHT: 64 IN | OXYGEN SATURATION: 97 % | RESPIRATION RATE: 16 BRPM | TEMPERATURE: 98 F

## 2019-10-10 DIAGNOSIS — Z98.890 OTHER SPECIFIED POSTPROCEDURAL STATES: Chronic | ICD-10-CM

## 2019-10-10 LAB
APPEARANCE UR: CLEAR — SIGNIFICANT CHANGE UP
BILIRUB UR-MCNC: NEGATIVE — SIGNIFICANT CHANGE UP
COLOR SPEC: YELLOW — SIGNIFICANT CHANGE UP
DIFF PNL FLD: ABNORMAL
GLUCOSE UR QL: NEGATIVE — SIGNIFICANT CHANGE UP
KETONES UR-MCNC: 15 MG/DL
LEUKOCYTE ESTERASE UR-ACNC: ABNORMAL
NITRITE UR-MCNC: NEGATIVE — SIGNIFICANT CHANGE UP
PH UR: 6 — SIGNIFICANT CHANGE UP (ref 5–8)
PROT UR-MCNC: 100 MG/DL
SP GR SPEC: >=1.03 — SIGNIFICANT CHANGE UP (ref 1–1.03)
UROBILINOGEN FLD QL: 0.2 E.U./DL — SIGNIFICANT CHANGE UP

## 2019-10-10 PROCEDURE — 99283 EMERGENCY DEPT VISIT LOW MDM: CPT

## 2019-10-10 RX ORDER — CEFUROXIME AXETIL 250 MG
1 TABLET ORAL
Qty: 14 | Refills: 0
Start: 2019-10-10 | End: 2019-10-16

## 2019-10-10 RX ORDER — PHENAZOPYRIDINE HCL 100 MG
200 TABLET ORAL ONCE
Refills: 0 | Status: COMPLETED | OUTPATIENT
Start: 2019-10-10 | End: 2019-10-10

## 2019-10-10 RX ORDER — PHENAZOPYRIDINE HCL 100 MG
1 TABLET ORAL
Qty: 9 | Refills: 0
Start: 2019-10-10 | End: 2019-10-12

## 2019-10-10 RX ORDER — IBUPROFEN 200 MG
600 TABLET ORAL ONCE
Refills: 0 | Status: COMPLETED | OUTPATIENT
Start: 2019-10-10 | End: 2019-10-10

## 2019-10-10 RX ADMIN — Medication 600 MILLIGRAM(S): at 02:02

## 2019-10-10 RX ADMIN — Medication 200 MILLIGRAM(S): at 02:02

## 2019-10-10 NOTE — ED PROVIDER NOTE - CARE PLAN
Principal Discharge DX:	Deras catheter problem, initial encounter  Secondary Diagnosis:	Urinary tract infection

## 2019-10-10 NOTE — ED PROVIDER NOTE - PATIENT PORTAL LINK FT
You can access the FollowMyHealth Patient Portal offered by [facility] by registering at the following website: [email or web address]. By joining [employer]’s FollowMyHealth portal, you will also be able to view your health information using other applications (apps) compatible with our system.

## 2019-10-10 NOTE — ED PROVIDER NOTE - CLINICAL SUMMARY MEDICAL DECISION MAKING FREE TEXT BOX
plan to start pyridium, patient to continue flomax, has no s/s of urosepsis, well appearing, and nontoxic, with functional and draining mcclellan catheter. UA and U Cx, patient declining cipro as he has chronic tendinitis, and will not take. will start on ceftin, and dc , has urology follow up.

## 2019-10-10 NOTE — ED PROVIDER NOTE - OBJECTIVE STATEMENT
70 yo M, hx of BPH and chronic urinary strictures, with indwelling mcclellan, which was placed with dr mitchell on friday, presents with suprapubic pressure and discomfort. per patient, dr mitchell attempted to remove mcclellan on monday, and failed voiding trial, and mcclellan was reinserted. supposed to see urology on friday in 2 days for possible removal. patient has elective almas laser for prostate scheduled in 2 weeks. denies fever, chills, flank pain, denies rectal pain or abd pain. notes small amount of blood in urine in mcclellan bag, but now resolved. denies obstructive urine symptoms. draining well otherwise. patient takes medicinal thc and took some prior to arrival to ED, notes increasing anxiety, about having indwelling mcclellan.

## 2019-10-10 NOTE — ED PROVIDER NOTE - CARE PROVIDER_API CALL
Bebo Byrd)  Urology  [address], [facility], [place] [address]  Phone: [phone]  Fax: [phone]  Follow Up Time:

## 2019-10-10 NOTE — ED ADULT TRIAGE NOTE - CHIEF COMPLAINT QUOTE
pt complains of suprapubic pain, states he had a mcclellan cath inserted Friday last week by his urologist. Pt states it's draining well, however feels a lot of pain. Appears anxious, took medical marijuana pta.

## 2019-10-11 ENCOUNTER — INBOUND DOCUMENT (OUTPATIENT)
Age: 69
End: 2019-10-11

## 2019-10-11 LAB
CULTURE RESULTS: NO GROWTH — SIGNIFICANT CHANGE UP
SPECIMEN SOURCE: SIGNIFICANT CHANGE UP

## 2019-10-15 DIAGNOSIS — Z88.0 ALLERGY STATUS TO PENICILLIN: ICD-10-CM

## 2019-10-15 DIAGNOSIS — T83.091A OTHER MECHANICAL COMPLICATION OF INDWELLING URETHRAL CATHETER, INITIAL ENCOUNTER: ICD-10-CM

## 2019-10-15 DIAGNOSIS — R10.30 LOWER ABDOMINAL PAIN, UNSPECIFIED: ICD-10-CM

## 2019-10-15 DIAGNOSIS — N39.0 URINARY TRACT INFECTION, SITE NOT SPECIFIED: ICD-10-CM
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
## 2019-10-16 VITALS
DIASTOLIC BLOOD PRESSURE: 93 MMHG | SYSTOLIC BLOOD PRESSURE: 140 MMHG | WEIGHT: 122.14 LBS | HEART RATE: 97 BPM | HEIGHT: 64 IN | OXYGEN SATURATION: 99 % | TEMPERATURE: 98 F | RESPIRATION RATE: 18 BRPM

## 2019-10-16 NOTE — ASU PATIENT PROFILE, ADULT - PMH
Anxiety    Asthma    BPH with obstruction/lower urinary tract symptoms    Chronic bacterial prostatitis    History of hepatitis B    History of kidney stones    HLD (hyperlipidemia)    MARCIA (obstructive sleep apnea)    Thrombocytopenia Anxiety    Asthma    BPH with obstruction/lower urinary tract symptoms    Chronic bacterial prostatitis    History of hepatitis B    History of kidney stones    HLD (hyperlipidemia)    MARCIA (obstructive sleep apnea)  mouth guard  Thrombocytopenia

## 2019-10-16 NOTE — ASU PATIENT PROFILE, ADULT - PSH
H/O wisdom tooth extraction    S/P bilateral inguinal hernia repair    S/P laser trabeculoplasty of eye  left x 2

## 2019-10-17 ENCOUNTER — APPOINTMENT (OUTPATIENT)
Dept: UROLOGY | Facility: HOSPITAL | Age: 69
End: 2019-10-17

## 2019-10-17 ENCOUNTER — OUTPATIENT (OUTPATIENT)
Dept: OUTPATIENT SERVICES | Facility: HOSPITAL | Age: 69
LOS: 1 days | Discharge: HOME | End: 2019-10-17
Payer: MEDICARE

## 2019-10-17 DIAGNOSIS — Z98.890 OTHER SPECIFIED POSTPROCEDURAL STATES: Chronic | ICD-10-CM

## 2019-10-17 DIAGNOSIS — N40.1 BENIGN PROSTATIC HYPERPLASIA WITH LOWER URINARY TRACT SYMPTOMS: ICD-10-CM

## 2019-10-17 DIAGNOSIS — K08.409 PARTIAL LOSS OF TEETH, UNSPECIFIED CAUSE, UNSPECIFIED CLASS: Chronic | ICD-10-CM

## 2019-10-17 PROCEDURE — 52648 LASER SURGERY OF PROSTATE: CPT

## 2019-10-17 RX ORDER — TAMSULOSIN HYDROCHLORIDE 0.4 MG/1
0.4 CAPSULE ORAL AT BEDTIME
Refills: 0 | Status: DISCONTINUED | OUTPATIENT
Start: 2019-10-17 | End: 2019-10-18

## 2019-10-17 RX ORDER — ALPRAZOLAM 0.25 MG
0.5 TABLET ORAL EVERY 12 HOURS
Refills: 0 | Status: DISCONTINUED | OUTPATIENT
Start: 2019-10-17 | End: 2019-10-17

## 2019-10-17 RX ORDER — CEFAZOLIN SODIUM 1 G
2000 VIAL (EA) INJECTION EVERY 8 HOURS
Refills: 0 | Status: COMPLETED | OUTPATIENT
Start: 2019-10-17 | End: 2019-10-18

## 2019-10-17 RX ORDER — OXYCODONE AND ACETAMINOPHEN 5; 325 MG/1; MG/1
2 TABLET ORAL EVERY 6 HOURS
Refills: 0 | Status: DISCONTINUED | OUTPATIENT
Start: 2019-10-17 | End: 2019-10-17

## 2019-10-17 RX ORDER — OXYCODONE AND ACETAMINOPHEN 5; 325 MG/1; MG/1
1 TABLET ORAL EVERY 4 HOURS
Refills: 0 | Status: DISCONTINUED | OUTPATIENT
Start: 2019-10-17 | End: 2019-10-17

## 2019-10-17 RX ORDER — LIDOCAINE 4 G/100G
1 CREAM TOPICAL
Refills: 0 | Status: DISCONTINUED | OUTPATIENT
Start: 2019-10-17 | End: 2019-10-18

## 2019-10-17 RX ORDER — DIAZEPAM 5 MG
5 TABLET ORAL EVERY 8 HOURS
Refills: 0 | Status: DISCONTINUED | OUTPATIENT
Start: 2019-10-17 | End: 2019-10-18

## 2019-10-17 RX ORDER — SODIUM CHLORIDE 9 MG/ML
1000 INJECTION, SOLUTION INTRAVENOUS
Refills: 0 | Status: DISCONTINUED | OUTPATIENT
Start: 2019-10-17 | End: 2019-10-18

## 2019-10-17 RX ORDER — DOCUSATE SODIUM 100 MG
100 CAPSULE ORAL THREE TIMES A DAY
Refills: 0 | Status: DISCONTINUED | OUTPATIENT
Start: 2019-10-17 | End: 2019-10-18

## 2019-10-17 RX ORDER — ATROPA BELLADONNA AND OPIUM 16.2; 6 MG/1; MG/1
1 SUPPOSITORY RECTAL ONCE
Refills: 0 | Status: DISCONTINUED | OUTPATIENT
Start: 2019-10-17 | End: 2019-10-17

## 2019-10-17 RX ORDER — OXYBUTYNIN CHLORIDE 5 MG
5 TABLET ORAL ONCE
Refills: 0 | Status: DISCONTINUED | OUTPATIENT
Start: 2019-10-17 | End: 2019-10-18

## 2019-10-17 RX ORDER — CEFAZOLIN SODIUM 1 G
2000 VIAL (EA) INJECTION EVERY 8 HOURS
Refills: 0 | Status: DISCONTINUED | OUTPATIENT
Start: 2019-10-17 | End: 2019-10-17

## 2019-10-17 RX ORDER — ACETAMINOPHEN 500 MG
650 TABLET ORAL EVERY 6 HOURS
Refills: 0 | Status: DISCONTINUED | OUTPATIENT
Start: 2019-10-17 | End: 2019-10-18

## 2019-10-17 RX ORDER — SENNA PLUS 8.6 MG/1
1 TABLET ORAL AT BEDTIME
Refills: 0 | Status: DISCONTINUED | OUTPATIENT
Start: 2019-10-17 | End: 2019-10-18

## 2019-10-17 RX ORDER — LANOLIN ALCOHOL/MO/W.PET/CERES
3 CREAM (GRAM) TOPICAL AT BEDTIME
Refills: 0 | Status: DISCONTINUED | OUTPATIENT
Start: 2019-10-17 | End: 2019-10-18

## 2019-10-17 RX ORDER — METOCLOPRAMIDE HCL 10 MG
10 TABLET ORAL EVERY 6 HOURS
Refills: 0 | Status: DISCONTINUED | OUTPATIENT
Start: 2019-10-17 | End: 2019-10-18

## 2019-10-17 RX ORDER — ONDANSETRON 8 MG/1
4 TABLET, FILM COATED ORAL EVERY 6 HOURS
Refills: 0 | Status: DISCONTINUED | OUTPATIENT
Start: 2019-10-17 | End: 2019-10-18

## 2019-10-17 RX ADMIN — Medication 5 MILLIGRAM(S): at 23:30

## 2019-10-17 RX ADMIN — ATROPA BELLADONNA AND OPIUM 1 SUPPOSITORY(S): 16.2; 6 SUPPOSITORY RECTAL at 16:30

## 2019-10-17 RX ADMIN — Medication 2000 MILLIGRAM(S): at 21:57

## 2019-10-17 RX ADMIN — SENNA PLUS 1 TABLET(S): 8.6 TABLET ORAL at 21:58

## 2019-10-17 RX ADMIN — ATROPA BELLADONNA AND OPIUM 1 SUPPOSITORY(S): 16.2; 6 SUPPOSITORY RECTAL at 16:55

## 2019-10-17 RX ADMIN — Medication 100 MILLIGRAM(S): at 21:58

## 2019-10-17 RX ADMIN — OXYCODONE AND ACETAMINOPHEN 1 TABLET(S): 5; 325 TABLET ORAL at 17:58

## 2019-10-17 RX ADMIN — TAMSULOSIN HYDROCHLORIDE 0.4 MILLIGRAM(S): 0.4 CAPSULE ORAL at 21:58

## 2019-10-18 ENCOUNTER — TRANSCRIPTION ENCOUNTER (OUTPATIENT)
Age: 69
End: 2019-10-18

## 2019-10-18 VITALS
HEART RATE: 71 BPM | RESPIRATION RATE: 18 BRPM | TEMPERATURE: 98 F | SYSTOLIC BLOOD PRESSURE: 145 MMHG | DIASTOLIC BLOOD PRESSURE: 92 MMHG | OXYGEN SATURATION: 99 %

## 2019-10-18 LAB
ANION GAP SERPL CALC-SCNC: 8 MMOL/L — SIGNIFICANT CHANGE UP (ref 5–17)
BUN SERPL-MCNC: 9 MG/DL — SIGNIFICANT CHANGE UP (ref 7–23)
CALCIUM SERPL-MCNC: 8.7 MG/DL — SIGNIFICANT CHANGE UP (ref 8.4–10.5)
CHLORIDE SERPL-SCNC: 100 MMOL/L — SIGNIFICANT CHANGE UP (ref 96–108)
CO2 SERPL-SCNC: 27 MMOL/L — SIGNIFICANT CHANGE UP (ref 22–31)
CREAT SERPL-MCNC: 0.77 MG/DL — SIGNIFICANT CHANGE UP (ref 0.5–1.3)
GLUCOSE SERPL-MCNC: 104 MG/DL — HIGH (ref 70–99)
HCT VFR BLD CALC: 36.4 % — LOW (ref 39–50)
HGB BLD-MCNC: 12 G/DL — LOW (ref 13–17)
MAGNESIUM SERPL-MCNC: 2 MG/DL — SIGNIFICANT CHANGE UP (ref 1.6–2.6)
MCHC RBC-ENTMCNC: 29.5 PG — SIGNIFICANT CHANGE UP (ref 27–34)
MCHC RBC-ENTMCNC: 33 GM/DL — SIGNIFICANT CHANGE UP (ref 32–36)
MCV RBC AUTO: 89.4 FL — SIGNIFICANT CHANGE UP (ref 80–100)
NRBC # BLD: 0 /100 WBCS — SIGNIFICANT CHANGE UP (ref 0–0)
PHOSPHATE SERPL-MCNC: 3.9 MG/DL — SIGNIFICANT CHANGE UP (ref 2.5–4.5)
PLATELET # BLD AUTO: 219 K/UL — SIGNIFICANT CHANGE UP (ref 150–400)
POTASSIUM SERPL-MCNC: 4.5 MMOL/L — SIGNIFICANT CHANGE UP (ref 3.5–5.3)
POTASSIUM SERPL-SCNC: 4.5 MMOL/L — SIGNIFICANT CHANGE UP (ref 3.5–5.3)
RBC # BLD: 4.07 M/UL — LOW (ref 4.2–5.8)
RBC # FLD: 12.8 % — SIGNIFICANT CHANGE UP (ref 10.3–14.5)
SODIUM SERPL-SCNC: 135 MMOL/L — SIGNIFICANT CHANGE UP (ref 135–145)
WBC # BLD: 10.53 K/UL — HIGH (ref 3.8–10.5)
WBC # FLD AUTO: 10.53 K/UL — HIGH (ref 3.8–10.5)

## 2019-10-18 PROCEDURE — 86901 BLOOD TYPING SEROLOGIC RH(D): CPT

## 2019-10-18 PROCEDURE — C1889: CPT

## 2019-10-18 PROCEDURE — 86900 BLOOD TYPING SEROLOGIC ABO: CPT

## 2019-10-18 PROCEDURE — 86850 RBC ANTIBODY SCREEN: CPT

## 2019-10-18 PROCEDURE — 83735 ASSAY OF MAGNESIUM: CPT

## 2019-10-18 PROCEDURE — 84100 ASSAY OF PHOSPHORUS: CPT

## 2019-10-18 PROCEDURE — 52648 LASER SURGERY OF PROSTATE: CPT

## 2019-10-18 PROCEDURE — 36415 COLL VENOUS BLD VENIPUNCTURE: CPT

## 2019-10-18 PROCEDURE — 87086 URINE CULTURE/COLONY COUNT: CPT

## 2019-10-18 PROCEDURE — 85027 COMPLETE CBC AUTOMATED: CPT

## 2019-10-18 PROCEDURE — 80048 BASIC METABOLIC PNL TOTAL CA: CPT

## 2019-10-18 RX ORDER — SODIUM CHLORIDE 9 MG/ML
3 INJECTION INTRAMUSCULAR; INTRAVENOUS; SUBCUTANEOUS EVERY 8 HOURS
Refills: 0 | Status: DISCONTINUED | OUTPATIENT
Start: 2019-10-18 | End: 2019-10-18

## 2019-10-18 RX ADMIN — Medication 100 MILLIGRAM(S): at 14:06

## 2019-10-18 RX ADMIN — Medication 3 MILLIGRAM(S): at 03:21

## 2019-10-18 RX ADMIN — Medication 100 MILLIGRAM(S): at 05:29

## 2019-10-18 RX ADMIN — Medication 2000 MILLIGRAM(S): at 05:29

## 2019-10-18 RX ADMIN — Medication 2000 MILLIGRAM(S): at 14:06

## 2019-10-18 RX ADMIN — SODIUM CHLORIDE 3 MILLILITER(S): 9 INJECTION INTRAMUSCULAR; INTRAVENOUS; SUBCUTANEOUS at 14:06

## 2019-10-18 NOTE — PROGRESS NOTE ADULT - PROBLEM SELECTOR PLAN 1
-stable  -OOB  -SCD's, IS  -f/u labs  -d/c CBI  -possible TOV this am and d/c home  -continue present care
-stable  -OOB  -IS, SCD's  -Diet: regular  -Antibx: cafazolin x 3  -I's & O's  -pain control  -IVF's  -continue CBI/FC  -possible TOV in am

## 2019-10-18 NOTE — DISCHARGE NOTE NURSING/CASE MANAGEMENT/SOCIAL WORK - PATIENT PORTAL LINK FT
You can access the FollowMyHealth Patient Portal offered by [facility] by registering at the following website: [email or web address]. By joining [employer]’s FollowMyHealth portal, you will also be able to view your health information using other applications (apps) compatible with our system.

## 2019-10-18 NOTE — DISCHARGE NOTE PROVIDER - NSDCCPCAREPLAN_GEN_ALL_CORE_FT
PRINCIPAL DISCHARGE DIAGNOSIS  Diagnosis: BPH with obstruction/lower urinary tract symptoms  Assessment and Plan of Treatment: BPH with obstruction/lower urinary tract symptoms

## 2019-10-18 NOTE — DISCHARGE NOTE PROVIDER - HOSPITAL COURSE
69M here for BPH s/p TURP 10/17. He passed his TOV today with minimal PVR. He tolerated the procedure well. VSS, afebrile, ambulatory and hemodynamically stable.

## 2019-10-18 NOTE — PROGRESS NOTE ADULT - SUBJECTIVE AND OBJECTIVE BOX
INTERVAL HPI/OVERNIGHT EVENTS:  No acute events overnight.    VITALS:    T(F): 98.3 (10-18-19 @ 05:38), Max: 98.3 (10-18-19 @ 05:38)  HR: 78 (10-18-19 @ 05:38) (64 - 78)  BP: 126/77 (10-18-19 @ 05:38) (104/79 - 134/87)  RR: 17 (10-18-19 @ 05:38) (14 - 26)  SpO2: 96% (10-18-19 @ 05:38) (94% - 99%)  Wt(kg): --    I&O's Detail    17 Oct 2019 07:01  -  18 Oct 2019 06:24  --------------------------------------------------------  IN:    Continuous Bladder Irrigation: 2750 mL    lactated ringers.: 300 mL  Total IN: 3050 mL    OUT:    Continuous Bladder Irrigation: 9200 mL  Total OUT: 9200 mL    Total NET: -6150 mL          MEDICATIONS:    ANTIBIOTICS:  ceFAZolin  Injectable. 2000 milliGRAM(s) IV Push every 8 hours      PAIN CONTROL:  acetaminophen   Tablet .. 650 milliGRAM(s) Oral every 6 hours PRN  diazepam    Tablet 5 milliGRAM(s) Oral every 8 hours PRN  melatonin 3 milliGRAM(s) Oral at bedtime PRN  metoclopramide Injectable 10 milliGRAM(s) IV Push every 6 hours PRN  ondansetron Injectable 4 milliGRAM(s) IV Push every 6 hours PRN       MEDS:  oxybutynin 5 milliGRAM(s) Oral once PRN      HEME/ONC        PHYSICAL EXAM:  General: No acute distress.  Alert and Oriented  Abdominal Exam: soft, NT, ND   Exam: CBI/FC intact, urine clear      LABS:                RADIOLOGY & ADDITIONAL TESTS:
UROLOGY POST OP NOTE (PAGER # [phone])    PROCEDURE: cysto, TURP    T(C): 36.6 (10-17-19 @ 18:30), Max: 36.6 (10-17-19 @ 18:30)  HR: 72 (10-17-19 @ 19:30) (64 - 72)  BP: 119/69 (10-17-19 @ 19:30) (119/69 - 134/87)  RR: 20 (10-17-19 @ 19:30) (14 - 26)  SpO2: 98% (10-17-19 @ 19:30) (97% - 99%)  Wt(kg): --  UO: CBI    SUBJECTIVE: pain controlled. No N/V. No CP/SOB.    ON PE: alert and awake    Abdomen: soft, NT, ND    : CBI/FC intact, urine clear

## 2019-10-18 NOTE — DISCHARGE NOTE PROVIDER - NSDCFUADDINST_GEN_ALL_CORE_FT
Stay well hydrated, May shower. No strenuous activity until you speak with your Surgeon. Call  with fever >101, questions and to set up your follow up appointment  Do not drive, operate machinery while taking pain     Blood in your urine. It's normal to see blood right after surgery. Urination might be painful. This is normal and will improve Stay well hydrated, May shower. No strenuous activity until you speak with your Surgeon. Call  with fever >101, questions and to set up your follow up appointment  Do not drive, operate machinery while taking pain     Blood in your urine. It's normal to see blood right after surgery. Urination might be painful. This is normal and will improve.  Continue Taking your Flomax

## 2019-10-20 ENCOUNTER — EMERGENCY (EMERGENCY)
Facility: HOSPITAL | Age: 69
LOS: 1 days | Discharge: HOME | End: 2019-10-20
Attending: EMERGENCY MEDICINE | Admitting: EMERGENCY MEDICINE
Payer: MEDICARE

## 2019-10-20 VITALS
WEIGHT: 121.92 LBS | DIASTOLIC BLOOD PRESSURE: 99 MMHG | SYSTOLIC BLOOD PRESSURE: 145 MMHG | TEMPERATURE: 98 F | RESPIRATION RATE: 16 BRPM | OXYGEN SATURATION: 97 % | HEART RATE: 100 BPM

## 2019-10-20 VITALS
HEART RATE: 83 BPM | DIASTOLIC BLOOD PRESSURE: 78 MMHG | OXYGEN SATURATION: 99 % | RESPIRATION RATE: 16 BRPM | TEMPERATURE: 98 F | SYSTOLIC BLOOD PRESSURE: 118 MMHG

## 2019-10-20 DIAGNOSIS — R10.30 LOWER ABDOMINAL PAIN, UNSPECIFIED: ICD-10-CM

## 2019-10-20 DIAGNOSIS — K08.409 PARTIAL LOSS OF TEETH, UNSPECIFIED CAUSE, UNSPECIFIED CLASS: Chronic | ICD-10-CM

## 2019-10-20 DIAGNOSIS — N39.0 URINARY TRACT INFECTION, SITE NOT SPECIFIED: ICD-10-CM

## 2019-10-20 DIAGNOSIS — Z98.890 OTHER SPECIFIED POSTPROCEDURAL STATES: Chronic | ICD-10-CM

## 2019-10-20 PROBLEM — E78.5 HYPERLIPIDEMIA, UNSPECIFIED: Chronic | Status: ACTIVE | Noted: 2019-10-16

## 2019-10-20 PROBLEM — Z87.442 PERSONAL HISTORY OF URINARY CALCULI: Chronic | Status: ACTIVE | Noted: 2019-10-16

## 2019-10-20 PROBLEM — Z86.19 PERSONAL HISTORY OF OTHER INFECTIOUS AND PARASITIC DISEASES: Chronic | Status: ACTIVE | Noted: 2019-10-16

## 2019-10-20 PROBLEM — J45.909 UNSPECIFIED ASTHMA, UNCOMPLICATED: Chronic | Status: ACTIVE | Noted: 2019-10-16

## 2019-10-20 PROBLEM — D69.6 THROMBOCYTOPENIA, UNSPECIFIED: Chronic | Status: ACTIVE | Noted: 2019-10-16

## 2019-10-20 PROBLEM — G47.33 OBSTRUCTIVE SLEEP APNEA (ADULT) (PEDIATRIC): Chronic | Status: ACTIVE | Noted: 2019-10-16

## 2019-10-20 PROBLEM — N40.1 BENIGN PROSTATIC HYPERPLASIA WITH LOWER URINARY TRACT SYMPTOMS: Chronic | Status: ACTIVE | Noted: 2019-10-16

## 2019-10-20 LAB
ALBUMIN SERPL ELPH-MCNC: 3.3 G/DL — LOW (ref 3.4–5)
ALP SERPL-CCNC: 70 U/L — SIGNIFICANT CHANGE UP (ref 40–120)
ALT FLD-CCNC: 12 U/L — SIGNIFICANT CHANGE UP (ref 12–42)
ANION GAP SERPL CALC-SCNC: 8 MMOL/L — LOW (ref 9–16)
APPEARANCE UR: CLEAR — SIGNIFICANT CHANGE UP
AST SERPL-CCNC: 20 U/L — SIGNIFICANT CHANGE UP (ref 15–37)
BASOPHILS NFR BLD AUTO: 0.1 % — SIGNIFICANT CHANGE UP (ref 0–2)
BILIRUB SERPL-MCNC: 0.8 MG/DL — SIGNIFICANT CHANGE UP (ref 0.2–1.2)
BILIRUB UR-MCNC: ABNORMAL
BUN SERPL-MCNC: 13 MG/DL — SIGNIFICANT CHANGE UP (ref 7–23)
CALCIUM SERPL-MCNC: 8.6 MG/DL — SIGNIFICANT CHANGE UP (ref 8.5–10.5)
CHLORIDE SERPL-SCNC: 93 MMOL/L — LOW (ref 96–108)
CK SERPL-CCNC: 69 U/L — SIGNIFICANT CHANGE UP (ref 39–308)
CO2 SERPL-SCNC: 30 MMOL/L — SIGNIFICANT CHANGE UP (ref 22–31)
COLOR SPEC: ABNORMAL
CREAT SERPL-MCNC: 0.66 MG/DL — SIGNIFICANT CHANGE UP (ref 0.5–1.3)
CULTURE RESULTS: NO GROWTH — SIGNIFICANT CHANGE UP
DIFF PNL FLD: ABNORMAL
EOSINOPHIL NFR BLD AUTO: 1.1 % — SIGNIFICANT CHANGE UP (ref 0–6)
EXTRA BLUE TOP TUBE: SIGNIFICANT CHANGE UP
EXTRA GREEN TOP TUBE: SIGNIFICANT CHANGE UP
EXTRA LAVENDER TOP TUBE: SIGNIFICANT CHANGE UP
GLUCOSE SERPL-MCNC: 110 MG/DL — HIGH (ref 70–99)
GLUCOSE UR QL: NEGATIVE — SIGNIFICANT CHANGE UP
HCT VFR BLD CALC: 32.7 % — LOW (ref 39–50)
HGB BLD-MCNC: 11.4 G/DL — LOW (ref 13–17)
IMM GRANULOCYTES NFR BLD AUTO: 0.6 % — SIGNIFICANT CHANGE UP (ref 0–1.5)
KETONES UR-MCNC: ABNORMAL MG/DL
LEUKOCYTE ESTERASE UR-ACNC: ABNORMAL
LYMPHOCYTES # BLD AUTO: 10.7 % — LOW (ref 13–44)
MCHC RBC-ENTMCNC: 30.2 PG — SIGNIFICANT CHANGE UP (ref 27–34)
MCHC RBC-ENTMCNC: 34.9 G/DL — SIGNIFICANT CHANGE UP (ref 32–36)
MCV RBC AUTO: 86.7 FL — SIGNIFICANT CHANGE UP (ref 80–100)
MONOCYTES NFR BLD AUTO: 9.7 % — SIGNIFICANT CHANGE UP (ref 2–14)
NEUTROPHILS NFR BLD AUTO: 77.8 % — HIGH (ref 43–77)
NITRITE UR-MCNC: POSITIVE
PH UR: 6.5 — SIGNIFICANT CHANGE UP (ref 5–8)
PLATELET # BLD AUTO: 205 K/UL — SIGNIFICANT CHANGE UP (ref 150–400)
POTASSIUM SERPL-MCNC: 4 MMOL/L — SIGNIFICANT CHANGE UP (ref 3.5–5.3)
POTASSIUM SERPL-SCNC: 4 MMOL/L — SIGNIFICANT CHANGE UP (ref 3.5–5.3)
PROT SERPL-MCNC: 6.8 G/DL — SIGNIFICANT CHANGE UP (ref 6.4–8.2)
PROT UR-MCNC: >=300 MG/DL
RBC # BLD: 3.77 M/UL — LOW (ref 4.2–5.8)
RBC # FLD: 13.2 % — SIGNIFICANT CHANGE UP (ref 10.3–14.5)
SODIUM SERPL-SCNC: 131 MMOL/L — LOW (ref 132–145)
SP GR SPEC: 1.01 — SIGNIFICANT CHANGE UP (ref 1–1.03)
SPECIMEN SOURCE: SIGNIFICANT CHANGE UP
UROBILINOGEN FLD QL: 1 E.U./DL — SIGNIFICANT CHANGE UP
WBC # BLD: 8.7 K/UL — SIGNIFICANT CHANGE UP (ref 3.8–10.5)
WBC # FLD AUTO: 8.7 K/UL — SIGNIFICANT CHANGE UP (ref 3.8–10.5)

## 2019-10-20 PROCEDURE — 99284 EMERGENCY DEPT VISIT MOD MDM: CPT

## 2019-10-20 PROCEDURE — 74176 CT ABD & PELVIS W/O CONTRAST: CPT | Mod: 26

## 2019-10-20 RX ORDER — CEFTRIAXONE 500 MG/1
1000 INJECTION, POWDER, FOR SOLUTION INTRAMUSCULAR; INTRAVENOUS ONCE
Refills: 0 | Status: COMPLETED | OUTPATIENT
Start: 2019-10-20 | End: 2019-10-20

## 2019-10-20 RX ORDER — AZTREONAM 2 G
1 VIAL (EA) INJECTION
Qty: 20 | Refills: 0
Start: 2019-10-20 | End: 2019-10-29

## 2019-10-20 RX ADMIN — CEFTRIAXONE 100 MILLIGRAM(S): 500 INJECTION, POWDER, FOR SOLUTION INTRAMUSCULAR; INTRAVENOUS at 14:02

## 2019-10-20 NOTE — ED PROVIDER NOTE - CLINICAL SUMMARY MEDICAL DECISION MAKING FREE TEXT BOX
The patient's symptoms progressively improved throughout the ED stay.  ED evaluation and management discussed with the patient and family (if available) in detail.  Close PMD and/or specialist follow up encouraged.  Strict ED return instructions discussed in detail for any worsening or new symptoms. The patient was given the opportunity to ask any questions about their discharge diagnosis and discharge instructions.  The patient verbalized understanding of these instructions and need to return to the ED for any worsening of illness or for any concern. The patient understands Emergency Department diagnosis is a preliminary diagnosis often based on limited information and that the patient must adhere to the follow-up plan as discussed. The patient tolerated PO fluids.  At the time of discharge from the Emergency Department, the patient is alert with fluent appropriate speech and ambulatory without difficulty.  A medical screening examination was performed and no emergency medical condition was identified.    urine has cleared with irrigation - no clots

## 2019-10-20 NOTE — ED PROVIDER NOTE - OBJECTIVE STATEMENT
69 yo male with complaint of urinary rentention x 1 day - 3 days s/p green light therapy procedure w dr. Bragg of urology.   c/o of lower anterior pelvic pain and fullness     no fever no flank pain no cp no sob no abd pain

## 2019-10-20 NOTE — ED ADULT NURSE NOTE - OBJECTIVE STATEMENT
Spasming pain in suprapubic region after "Greenlight laser" surgery by MD Goldsmith 10/17. Patient states pain acute onset 0330 this morning, denies nausea, vomiting and diarrhea, passing stool this morning and urinating with clots over last 2 days.

## 2019-10-20 NOTE — ED PROVIDER NOTE - NSFOLLOWUPINSTRUCTIONS_ED_ALL_ED_FT
take medications as directed     follow up with dr mitchell    return to ER for worsening of symptoms or any other concern

## 2019-10-20 NOTE — ED PROVIDER NOTE - PATIENT PORTAL LINK FT
You can access the FollowMyHealth Patient Portal offered by [facility] by registering at the following website: [email or web address]. By joining [facility]’s FollowMyHealth portal, you will also be able to view your health information using other applications (apps) compatible with our system.

## 2019-10-23 ENCOUNTER — APPOINTMENT (OUTPATIENT)
Dept: UROLOGY | Facility: CLINIC | Age: 69
End: 2019-10-23
Payer: MEDICARE

## 2019-10-23 VITALS — DIASTOLIC BLOOD PRESSURE: 71 MMHG | TEMPERATURE: 97.6 F | HEART RATE: 98 BPM | SYSTOLIC BLOOD PRESSURE: 113 MMHG

## 2019-10-23 PROCEDURE — 99024 POSTOP FOLLOW-UP VISIT: CPT

## 2019-10-23 NOTE — ASSESSMENT
[FreeTextEntry1] : 70yo male with BPH with incomplete bladder emptying\par Residuals are high > 300 \par Slow flow on Uroflow\par Cystoscopy revealed about 80 gm prostate\par s/p Greenlight PVP 10/17/19\par Passed TOV today with PVR < 100cc\par Continue tamsulosin\par F/u 1 month

## 2019-10-23 NOTE — HISTORY OF PRESENT ILLNESS
[Urinary Retention] : urinary retention [None] : None [FreeTextEntry1] : This is a 69yo male here for initial consultation of BPH with obstruction. He has had this problem for many years, starting with episode of acute retention in 2004, 3 months after a hernia repair. He thinks his symptoms may be related to mesh. Also seen in ER 12/2018 for right 3mm distal ureteral stone which he thinks passed. At the time, he switched cardura for tamsulosin. H/o incomplete emptying with residuals around 300cc. Also with h/o recurrent Enteroccous infections. Currently without symptoms. CT in 12/2018 also shows bladder stone. Currently no dysuria, hematuria or flank pain. AUASS = 24. PSA around 2.5 last year. His brother had prostate cancer. \par \par 10/04/19 Here for f/u. C/o worsening retention symptoms, difficulty urinating. Went to outside ER where urine culture was reportedly normal, no other tests performed. He is concerned about developing obstructive uropathy. No recent creatinine available. \par \par 10/23/19 Here for trial of void. Underwent Greenlight PVP last week. Discharged home without Deras but Deras replaced in ER. Also underwent CT scan which shows resolution of small distal ureteral stone. Urine now clear.  [Hematuria - Gross] : no gross hematuria

## 2019-10-23 NOTE — PHYSICAL EXAM
[General Appearance - Well Developed] : well developed [General Appearance - Well Nourished] : well nourished [Normal Appearance] : normal appearance [General Appearance - In No Acute Distress] : no acute distress [Well Groomed] : well groomed [Costovertebral Angle Tenderness] : no ~M costovertebral angle tenderness [Abdomen Soft] : soft [Abdomen Tenderness] : non-tender [FreeTextEntry1] : Deras removed, PVR = 35cc

## 2019-10-23 NOTE — LETTER BODY
[Dear  ___] : Dear  [unfilled], [Please see my note below.] : Please see my note below. [Courtesy Letter:] : I had the pleasure of seeing your patient, [unfilled], in my office today. [Sincerely,] : Sincerely, [Consult Closing:] : Thank you very much for allowing me to participate in the care of this patient.  If you have any questions, please do not hesitate to contact me. [FreeTextEntry3] : Bebo Byrd MD\par Urologic Oncology\par Department of Urology\par [facility]\par \par [employer] [facility] \par \par  [FreeTextEntry2] : Harjit Restrepo MD\par [address]\[place] [address]

## 2019-10-25 ENCOUNTER — CLINICAL ADVICE (OUTPATIENT)
Age: 69
End: 2019-10-25

## 2019-10-28 ENCOUNTER — LABORATORY RESULT (OUTPATIENT)
Age: 69
End: 2019-10-28

## 2019-10-29 ENCOUNTER — APPOINTMENT (OUTPATIENT)
Dept: UROLOGY | Facility: CLINIC | Age: 69
End: 2019-10-29
Payer: MEDICARE

## 2019-10-29 VITALS — SYSTOLIC BLOOD PRESSURE: 125 MMHG | HEART RATE: 83 BPM | TEMPERATURE: 98.2 F | DIASTOLIC BLOOD PRESSURE: 82 MMHG

## 2019-10-29 DIAGNOSIS — N20.0 CALCULUS OF KIDNEY: ICD-10-CM

## 2019-10-29 PROCEDURE — 99214 OFFICE O/P EST MOD 30 MIN: CPT | Mod: 24

## 2019-10-29 NOTE — HISTORY OF PRESENT ILLNESS
[Urinary Frequency] : urinary frequency [Nocturia] : nocturia [None] : None [FreeTextEntry1] : 69 M with history of BPH w/ obstruction, urinary retention and kidney stones.  \par S/p Green light PVP on 10/17/19\par Went to [facility] walk-in clinic yesterday for concerns of urinary frequency. Was told UA had leukocytes. and prescribed Bactrim. He notes slight relief since starting medication. \par Today, he passed a kidney stone. Will send for stone composition.\par CT (Renal Stone [facility]) on 10/20/19 showed moderate left hydronephrosis and hydroureter w/o obstructing renal or ureteral calculus. \par \par  [Urinary Incontinence] : no urinary incontinence [Urinary Retention] : no urinary retention [Urinary Urgency] : no urinary urgency [Straining] : no straining [Weak Stream] : no weak stream [Dysuria] : no dysuria [Hematuria - Gross] : no gross hematuria [Abdominal Pain] : no abdominal pain [Flank Pain] : no flank pain [Edema] : ~T edema was not present [Weight Loss] : no recent ~M [unfilled] weight loss [Fever] : no fever [Fatigue] : no fatigue [Nausea] : no nausea [Anorexia] : no anorexia

## 2019-10-29 NOTE — ASSESSMENT
[FreeTextEntry1] : Urinary Frequency\par - Repeat UA/Ucx \par -PVR= 0 (checked with two machines)\par - May continue Bactrim until sensitivity avaiilable\par \par Kidney Stone\par -Send stone for composition\par - Increase fluids for prevention\par -Continue Tamsulosin\par - Renal US prior to November appt\par \par \par Will call office or go to ER for fever, difficulty urinating or concerning symptoms. Will call with urine results.

## 2019-10-30 LAB
APPEARANCE: CLEAR
BACTERIA: NEGATIVE
BILIRUBIN URINE: NEGATIVE
BLOOD URINE: ABNORMAL
COLOR: COLORLESS
GLUCOSE QUALITATIVE U: NEGATIVE
HYALINE CASTS: 1 /LPF
KETONES URINE: ABNORMAL
LEUKOCYTE ESTERASE URINE: ABNORMAL
MICROSCOPIC-UA: NORMAL
NITRITE URINE: NEGATIVE
PH URINE: 6
PROTEIN URINE: NEGATIVE
RED BLOOD CELLS URINE: 2 /HPF
SPECIFIC GRAVITY URINE: 1
SQUAMOUS EPITHELIAL CELLS: 0 /HPF
UROBILINOGEN URINE: NORMAL
WHITE BLOOD CELLS URINE: 7 /HPF

## 2019-10-31 ENCOUNTER — LABORATORY RESULT (OUTPATIENT)
Age: 69
End: 2019-10-31

## 2019-11-01 ENCOUNTER — RESULT REVIEW (OUTPATIENT)
Age: 69
End: 2019-11-01

## 2019-11-01 LAB — BACTERIA UR CULT: NORMAL

## 2019-11-11 ENCOUNTER — FORM ENCOUNTER (OUTPATIENT)
Age: 69
End: 2019-11-11

## 2019-11-12 ENCOUNTER — OUTPATIENT (OUTPATIENT)
Dept: OUTPATIENT SERVICES | Facility: HOSPITAL | Age: 69
LOS: 1 days | End: 2019-11-12
Payer: MEDICARE

## 2019-11-12 ENCOUNTER — APPOINTMENT (OUTPATIENT)
Dept: ULTRASOUND IMAGING | Facility: HOSPITAL | Age: 69
End: 2019-11-12
Payer: MEDICARE

## 2019-11-12 DIAGNOSIS — Z98.890 OTHER SPECIFIED POSTPROCEDURAL STATES: Chronic | ICD-10-CM

## 2019-11-12 DIAGNOSIS — K08.409 PARTIAL LOSS OF TEETH, UNSPECIFIED CAUSE, UNSPECIFIED CLASS: Chronic | ICD-10-CM

## 2019-11-12 PROCEDURE — 76770 US EXAM ABDO BACK WALL COMP: CPT | Mod: 26

## 2019-11-12 PROCEDURE — 76770 US EXAM ABDO BACK WALL COMP: CPT

## 2019-11-20 ENCOUNTER — APPOINTMENT (OUTPATIENT)
Dept: UROLOGY | Facility: CLINIC | Age: 69
End: 2019-11-20
Payer: MEDICARE

## 2019-11-20 VITALS — TEMPERATURE: 98 F | SYSTOLIC BLOOD PRESSURE: 123 MMHG | HEART RATE: 94 BPM | DIASTOLIC BLOOD PRESSURE: 85 MMHG

## 2019-11-20 PROCEDURE — 51798 US URINE CAPACITY MEASURE: CPT

## 2019-11-20 PROCEDURE — 99214 OFFICE O/P EST MOD 30 MIN: CPT | Mod: 25

## 2019-11-20 RX ORDER — TADALAFIL 5 MG/1
5 TABLET ORAL
Qty: 30 | Refills: 6 | Status: ACTIVE | COMMUNITY
Start: 2019-11-20 | End: 1900-01-01

## 2019-11-20 RX ORDER — TAMSULOSIN HYDROCHLORIDE 0.4 MG/1
0.4 CAPSULE ORAL
Qty: 90 | Refills: 3 | Status: DISCONTINUED | COMMUNITY
Start: 2019-09-19 | End: 2019-11-20

## 2019-11-24 NOTE — LETTER BODY
[Dear  ___] : Dear  [unfilled], [Consult Closing:] : Thank you very much for allowing me to participate in the care of this patient.  If you have any questions, please do not hesitate to contact me. [Please see my note below.] : Please see my note below. [Courtesy Letter:] : I had the pleasure of seeing your patient, [unfilled], in my office today. [FreeTextEntry2] : Harjit Restrepo MD\par [address]\[place] [address] [Sincerely,] : Sincerely, [FreeTextEntry3] : Bebo Byrd MD\par Urologic Oncology\par Department of Urology\par [facility]\par \par [employer] at [facility] \par \par

## 2019-11-24 NOTE — PHYSICAL EXAM
[General Appearance - Well Developed] : well developed [General Appearance - Well Nourished] : well nourished [Well Groomed] : well groomed [Normal Appearance] : normal appearance [General Appearance - In No Acute Distress] : no acute distress [Abdomen Tenderness] : non-tender [Abdomen Soft] : soft [Costovertebral Angle Tenderness] : no ~M costovertebral angle tenderness [FreeTextEntry1] : PVR = 50cc [Affect] : the affect was normal [Oriented To Time, Place, And Person] : oriented to person, place, and time [Not Anxious] : not anxious [Mood] : the mood was normal [Normal Station and Gait] : the gait and station were normal for the patient's age [No Focal Deficits] : no focal deficits

## 2019-11-24 NOTE — HISTORY OF PRESENT ILLNESS
[FreeTextEntry1] : This is a 67yo male here for initial consultation of BPH with obstruction. He has had this problem for many years, starting with episode of acute retention in 2004, 3 months after a hernia repair. He thinks his symptoms may be related to mesh. Also seen in ER 12/2018 for right 3mm distal ureteral stone which he thinks passed. At the time, he switched cardura for tamsulosin. H/o incomplete emptying with residuals around 300cc. Also with h/o recurrent Enteroccous infections. Currently without symptoms. CT in 12/2018 also shows bladder stone. Currently no dysuria, hematuria or flank pain. AUASS = 24. PSA around 2.5 last year. His brother had prostate cancer. \par \par 10/04/19 Here for f/u. C/o worsening retention symptoms, difficulty urinating. Went to outside ER where urine culture was reportedly normal, no other tests performed. He is concerned about developing obstructive uropathy. No recent creatinine available. \par \par 10/23/19 Here for trial of void. Underwent Greenlight PVP last week. Discharged home without Deras but Deras replaced in ER. Also underwent CT scan which shows resolution of small distal ureteral stone. Urine now clear. \par \par 11/20/19 Here for f/u. Passed 4mm ureteral stone following surgery. Stone was 90% calcium phosphate. He has had prior stones in the past. Currently feeling well, wants to discuss stone prevention.  [Dysuria] : no dysuria [Hematuria - Gross] : no gross hematuria [None] : None

## 2019-11-24 NOTE — ASSESSMENT
[FreeTextEntry1] : 68yo male with BPH with incomplete bladder emptying\par s/p Greenlight PVP 10/17/19\par Voiding well with low residual\par Can stop tamsulosin\par Reviewed stone composition\par 90% calcium phosphate \par Reviewed dietary precautions\par Recommend LItholink 24 hr urine testing\par Reviewed instructions\par F/u to review results

## 2019-12-03 LAB
ANION GAP SERPL CALC-SCNC: 10 MMOL/L
BACTERIA UR CULT: NORMAL
BUN SERPL-MCNC: 13 MG/DL
CALCIUM SERPL-MCNC: 9.2 MG/DL
CALCIUM SERPL-MCNC: 9.2 MG/DL
CHLORIDE SERPL-SCNC: 96 MMOL/L
CO2 SERPL-SCNC: 27 MMOL/L
CREAT SERPL-MCNC: 0.82 MG/DL
GLUCOSE SERPL-MCNC: 131 MG/DL
PARATHYROID HORMONE INTACT: 59 PG/ML
POTASSIUM SERPL-SCNC: 4.5 MMOL/L
SODIUM SERPL-SCNC: 133 MMOL/L
URATE SERPL-MCNC: 4.3 MG/DL

## 2019-12-18 ENCOUNTER — APPOINTMENT (OUTPATIENT)
Dept: UROLOGY | Facility: CLINIC | Age: 69
End: 2019-12-18
Payer: MEDICARE

## 2019-12-18 VITALS — TEMPERATURE: 97.4 F | HEART RATE: 91 BPM | DIASTOLIC BLOOD PRESSURE: 87 MMHG | SYSTOLIC BLOOD PRESSURE: 129 MMHG

## 2019-12-18 PROCEDURE — 99213 OFFICE O/P EST LOW 20 MIN: CPT | Mod: 25

## 2019-12-18 PROCEDURE — 51798 US URINE CAPACITY MEASURE: CPT

## 2019-12-18 NOTE — HISTORY OF PRESENT ILLNESS
[Weak Stream] : weak stream [None] : None [FreeTextEntry1] : This is a 69yo male here for initial consultation of BPH with obstruction. He has had this problem for many years, starting with episode of acute retention in 2004, 3 months after a hernia repair. He thinks his symptoms may be related to mesh. Also seen in ER 12/2018 for right 3mm distal ureteral stone which he thinks passed. At the time, he switched cardura for tamsulosin. H/o incomplete emptying with residuals around 300cc. Also with h/o recurrent Enteroccous infections. Currently without symptoms. CT in 12/2018 also shows bladder stone. Currently no dysuria, hematuria or flank pain. AUASS = 24. PSA around 2.5 last year. His brother had prostate cancer. \par \par 10/04/19 Here for f/u. C/o worsening retention symptoms, difficulty urinating. Went to outside ER where urine culture was reportedly normal, no other tests performed. He is concerned about developing obstructive uropathy. No recent creatinine available. \par \par 10/23/19 Here for trial of void. Underwent Greenlight PVP last week. Discharged home without Deras but Deras replaced in ER. Also underwent CT scan which shows resolution of small distal ureteral stone. Urine now clear. \par \par 11/20/19 Here for f/u. Passed 4mm ureteral stone following surgery. Stone was 90% calcium phosphate. He has had prior stones in the past. Currently feeling well, wants to discuss stone prevention. \par \par 12/18/19 Here for f/u. Had 24 hr urine testing which is essentially unremarkable. Feels flow is slowing down since surgery. He stopped tamsulosin.  [Dysuria] : no dysuria [Hematuria - Gross] : no gross hematuria

## 2019-12-18 NOTE — PHYSICAL EXAM
[General Appearance - Well Developed] : well developed [General Appearance - Well Nourished] : well nourished [Normal Appearance] : normal appearance [Well Groomed] : well groomed [General Appearance - In No Acute Distress] : no acute distress [Abdomen Tenderness] : non-tender [Abdomen Soft] : soft [Costovertebral Angle Tenderness] : no ~M costovertebral angle tenderness [Oriented To Time, Place, And Person] : oriented to person, place, and time [Affect] : the affect was normal [Mood] : the mood was normal [Not Anxious] : not anxious [No Focal Deficits] : no focal deficits [Normal Station and Gait] : the gait and station were normal for the patient's age [Prostate Enlargement] : the prostate was not enlarged [Prostate Tenderness] : the prostate was not tender [No Prostate Nodules] : no prostate nodules [FreeTextEntry1] : PVR = 12cc

## 2019-12-18 NOTE — ASSESSMENT
[FreeTextEntry1] : 70yo male with BPH with incomplete bladder emptying\par s/p Greenlight PVP 10/17/19\par Voiding better, low residual\par Reviewed 24 hr urine testing, unremarkable, low risk for stone formation\par F/u 3 months\par

## 2019-12-18 NOTE — LETTER BODY
[Dear  ___] : Dear  [unfilled], [Courtesy Letter:] : I had the pleasure of seeing your patient, [unfilled], in my office today. [Please see my note below.] : Please see my note below. [Consult Closing:] : Thank you very much for allowing me to participate in the care of this patient.  If you have any questions, please do not hesitate to contact me. [Sincerely,] : Sincerely, [FreeTextEntry3] : Bebo Byrd MD\par Urologic Oncology\par Department of Urology\par [facility]\par \par [employer] [facility] \par \par  [FreeTextEntry2] : Harjit Restrepo MD\par [address]\[place] [address]

## 2020-01-14 ENCOUNTER — APPOINTMENT (OUTPATIENT)
Dept: NEPHROLOGY | Facility: CLINIC | Age: 70
End: 2020-01-14

## 2020-05-29 ENCOUNTER — APPOINTMENT (OUTPATIENT)
Dept: UROLOGY | Facility: CLINIC | Age: 70
End: 2020-05-29
Payer: MEDICARE

## 2020-05-29 VITALS — DIASTOLIC BLOOD PRESSURE: 86 MMHG | TEMPERATURE: 98.1 F | SYSTOLIC BLOOD PRESSURE: 121 MMHG | HEART RATE: 108 BPM

## 2020-05-29 PROCEDURE — 51798 US URINE CAPACITY MEASURE: CPT

## 2020-05-29 PROCEDURE — 99213 OFFICE O/P EST LOW 20 MIN: CPT | Mod: 25

## 2020-05-29 NOTE — LETTER BODY
[Dear  ___] : Dear  [unfilled], [Courtesy Letter:] : I had the pleasure of seeing your patient, [unfilled], in my office today. [Please see my note below.] : Please see my note below. [Consult Closing:] : Thank you very much for allowing me to participate in the care of this patient.  If you have any questions, please do not hesitate to contact me. [Sincerely,] : Sincerely, [FreeTextEntry2] : Harjit Restrepo MD\par [address]\[place] [address] [FreeTextEntry3] : Bebo Byrd MD\par Urologic Oncology\par Department of Urology\par [facility]\par \par [employer] [facility] \par \par

## 2020-05-29 NOTE — ASSESSMENT
[FreeTextEntry1] : 70yo male with BPH with incomplete bladder emptying\par s/p Greenlight PVP 10/17/19\par Voiding better, low residual\par UTI in 4/2020, f/u culture negative\par Reviewed PSA and urine results\par Return precautions discussed\par F/u 6 months\par

## 2020-05-29 NOTE — HISTORY OF PRESENT ILLNESS
[FreeTextEntry1] : This is a 67yo male here for initial consultation of BPH with obstruction. He has had this problem for many years, starting with episode of acute retention in 2004, 3 months after a hernia repair. He thinks his symptoms may be related to mesh. Also seen in ER 12/2018 for right 3mm distal ureteral stone which he thinks passed. At the time, he switched cardura for tamsulosin. H/o incomplete emptying with residuals around 300cc. Also with h/o recurrent Enteroccous infections. Currently without symptoms. CT in 12/2018 also shows bladder stone. Currently no dysuria, hematuria or flank pain. AUASS = 24. PSA around 2.5 last year. His brother had prostate cancer. \par \par 10/04/19 Here for f/u. C/o worsening retention symptoms, difficulty urinating. Went to outside ER where urine culture was reportedly normal, no other tests performed. He is concerned about developing obstructive uropathy. No recent creatinine available. \par \par 10/23/19 Here for trial of void. Underwent Greenlight PVP last week. Discharged home without Deras but Deras replaced in ER. Also underwent CT scan which shows resolution of small distal ureteral stone. Urine now clear. \par \par 11/20/19 Here for f/u. Passed 4mm ureteral stone following surgery. Stone was 90% calcium phosphate. He has had prior stones in the past. Currently feeling well, wants to discuss stone prevention. \par \par 12/18/19 Here for f/u. Had 24 hr urine testing which is essentially unremarkable. Feels flow is slowing down since surgery. He stopped tamsulosin. \par \par 5/29/20 Here for f/u. Currently feeling well. States he had UTI in April although he only had very mild symptoms. F/u culture negative. Recent PSA = 1.7.  [Nocturia] : nocturia [Straining] : no straining [Weak Stream] : no weak stream [Dysuria] : no dysuria [Hematuria - Gross] : no gross hematuria [Weight Loss] : no recent ~M [unfilled] weight loss [Fever] : no fever [Fatigue] : no fatigue [Nausea] : no nausea [None] : None

## 2020-05-29 NOTE — PHYSICAL EXAM
[General Appearance - Well Developed] : well developed [General Appearance - Well Nourished] : well nourished [Normal Appearance] : normal appearance [Well Groomed] : well groomed [General Appearance - In No Acute Distress] : no acute distress [Abdomen Soft] : soft [Abdomen Tenderness] : non-tender [Costovertebral Angle Tenderness] : no ~M costovertebral angle tenderness [Prostate Enlargement] : the prostate was not enlarged [Prostate Tenderness] : the prostate was not tender [No Prostate Nodules] : no prostate nodules [FreeTextEntry1] : PVR = 27cc [Oriented To Time, Place, And Person] : oriented to person, place, and time [Mood] : the mood was normal [Affect] : the affect was normal [Not Anxious] : not anxious [Normal Station and Gait] : the gait and station were normal for the patient's age [No Focal Deficits] : no focal deficits

## 2020-05-30 LAB
APPEARANCE: CLEAR
BACTERIA: NEGATIVE
BILIRUBIN URINE: NEGATIVE
BLOOD URINE: NEGATIVE
COLOR: NORMAL
GLUCOSE QUALITATIVE U: NEGATIVE
HYALINE CASTS: 0 /LPF
KETONES URINE: NEGATIVE
LEUKOCYTE ESTERASE URINE: NEGATIVE
MICROSCOPIC-UA: NORMAL
NITRITE URINE: NEGATIVE
PH URINE: 6.5
PROTEIN URINE: NEGATIVE
RED BLOOD CELLS URINE: 2 /HPF
SPECIFIC GRAVITY URINE: 1.01
SQUAMOUS EPITHELIAL CELLS: 0 /HPF
UROBILINOGEN URINE: NORMAL
WHITE BLOOD CELLS URINE: 0 /HPF

## 2020-06-01 LAB — BACTERIA UR CULT: NORMAL

## 2020-06-15 ENCOUNTER — APPOINTMENT (OUTPATIENT)
Dept: UROLOGY | Facility: CLINIC | Age: 70
End: 2020-06-15

## 2020-07-01 NOTE — ED PROVIDER NOTE - CPE EDP CARDIAC NORM
normal... Itraconazole Counseling:  I discussed with the patient the risks of itraconazole including but not limited to liver damage, nausea/vomiting, neuropathy, and severe allergy.  The patient understands that this medication is best absorbed when taken with acidic beverages such as non-diet cola or ginger ale.  The patient understands that monitoring is required including baseline LFTs and repeat LFTs at intervals.  The patient understands that they are to contact us or the primary physician if concerning signs are noted.

## 2020-12-21 ENCOUNTER — APPOINTMENT (OUTPATIENT)
Dept: UROLOGY | Facility: CLINIC | Age: 70
End: 2020-12-21
Payer: MEDICARE

## 2020-12-21 VITALS — DIASTOLIC BLOOD PRESSURE: 81 MMHG | TEMPERATURE: 96.7 F | SYSTOLIC BLOOD PRESSURE: 128 MMHG | HEART RATE: 59 BPM

## 2020-12-21 PROCEDURE — 51798 US URINE CAPACITY MEASURE: CPT

## 2020-12-21 PROCEDURE — 99213 OFFICE O/P EST LOW 20 MIN: CPT | Mod: 25

## 2020-12-21 NOTE — ASSESSMENT
[FreeTextEntry1] : 69yo male with BPH with incomplete bladder emptying\par s/p Greenlight PVP 10/17/19\par Voiding well, low residual\par UTI in 4/2020, f/u culture negative\par US 7/2020 normal\par F/u 6 months

## 2020-12-21 NOTE — LETTER BODY
[Dear  ___] : Dear  [unfilled], [Courtesy Letter:] : I had the pleasure of seeing your patient, [unfilled], in my office today. [Please see my note below.] : Please see my note below. [Consult Closing:] : Thank you very much for allowing me to participate in the care of this patient.  If you have any questions, please do not hesitate to contact me. [Sincerely,] : Sincerely, [FreeTextEntry2] : Harjit Restrepo MD\par [address]\[place] [address] [FreeTextEntry3] : Bebo Byrd MD\par Urologic Oncology\par Department of Urology\par [facility]\par \par [employer] [facility] \par \par

## 2020-12-21 NOTE — HISTORY OF PRESENT ILLNESS
[FreeTextEntry1] : This is a 69yo male here for initial consultation of BPH with obstruction. He has had this problem for many years, starting with episode of acute retention in 2004, 3 months after a hernia repair. He thinks his symptoms may be related to mesh. Also seen in ER 12/2018 for right 3mm distal ureteral stone which he thinks passed. At the time, he switched cardura for tamsulosin. H/o incomplete emptying with residuals around 300cc. Also with h/o recurrent Enteroccous infections. Currently without symptoms. CT in 12/2018 also shows bladder stone. Currently no dysuria, hematuria or flank pain. AUASS = 24. PSA around 2.5 last year. His brother had prostate cancer. \par \par 10/04/19 Here for f/u. C/o worsening retention symptoms, difficulty urinating. Went to outside ER where urine culture was reportedly normal, no other tests performed. He is concerned about developing obstructive uropathy. No recent creatinine available. \par \par 10/23/19 Here for trial of void. Underwent Greenlight PVP last week. Discharged home without Deras but Deras replaced in ER. Also underwent CT scan which shows resolution of small distal ureteral stone. Urine now clear. \par \par 11/20/19 Here for f/u. Passed 4mm ureteral stone following surgery. Stone was 90% calcium phosphate. He has had prior stones in the past. Currently feeling well, wants to discuss stone prevention. \par \par 12/18/19 Here for f/u. Had 24 hr urine testing which is essentially unremarkable. Feels flow is slowing down since surgery. He stopped tamsulosin. \par \par 5/29/20 Here for f/u. Currently feeling well. States he had UTI in April although he only had very mild symptoms. F/u culture negative. Recent PSA = 1.7. \par \par 12/21/20 Here for f/u. Feeling well, no complaints. Had renal bladder US in July which was normal, no stones, no hydronephrosis.  [Nocturia] : no nocturia [Straining] : no straining [Weak Stream] : no weak stream [Dysuria] : no dysuria [Hematuria - Gross] : no gross hematuria [Fever] : no fever [Fatigue] : no fatigue [Nausea] : no nausea [None] : None

## 2020-12-21 NOTE — PHYSICAL EXAM
[General Appearance - Well Developed] : well developed [General Appearance - Well Nourished] : well nourished [Normal Appearance] : normal appearance [Well Groomed] : well groomed [General Appearance - In No Acute Distress] : no acute distress [Abdomen Soft] : soft [Abdomen Tenderness] : non-tender [Costovertebral Angle Tenderness] : no ~M costovertebral angle tenderness [FreeTextEntry1] : JASON 5/2020 normal, PVR = 20cc [Oriented To Time, Place, And Person] : oriented to person, place, and time [Affect] : the affect was normal [Mood] : the mood was normal [Not Anxious] : not anxious [Normal Station and Gait] : the gait and station were normal for the patient's age [No Focal Deficits] : no focal deficits

## 2020-12-22 ENCOUNTER — NON-APPOINTMENT (OUTPATIENT)
Age: 70
End: 2020-12-22

## 2020-12-22 LAB
APPEARANCE: CLEAR
BACTERIA: NEGATIVE
BILIRUBIN URINE: NEGATIVE
BLOOD URINE: NEGATIVE
CALCIUM OXALATE CRYSTALS: ABNORMAL
COLOR: NORMAL
GLUCOSE QUALITATIVE U: NEGATIVE
HYALINE CASTS: 0 /LPF
KETONES URINE: NEGATIVE
LEUKOCYTE ESTERASE URINE: NEGATIVE
MICROSCOPIC-UA: NORMAL
NITRITE URINE: NEGATIVE
PH URINE: 6.5
PROTEIN URINE: NEGATIVE
RED BLOOD CELLS URINE: 2 /HPF
SPECIFIC GRAVITY URINE: 1.01
SQUAMOUS EPITHELIAL CELLS: 0 /HPF
UROBILINOGEN URINE: NORMAL
WHITE BLOOD CELLS URINE: 1 /HPF

## 2020-12-23 LAB — BACTERIA UR CULT: NORMAL

## 2021-01-24 NOTE — ED ADULT TRIAGE NOTE - ARRIVAL FROM
Pt presents to ED for SI without plan. Denies HI; denies auditory or visual disturbances. Pt initially uncooperative with staff; states she does not want her valuables locked up; despite explanations pt stating \"This is not what I signed up for; I am leaving.\" Crisis at bedside and after more thorough discussion pt cooperative but now increasingly withdrawn with staff.    Home

## 2021-06-21 ENCOUNTER — APPOINTMENT (OUTPATIENT)
Dept: UROLOGY | Facility: CLINIC | Age: 71
End: 2021-06-21
Payer: MEDICARE

## 2021-06-21 VITALS — HEART RATE: 69 BPM | TEMPERATURE: 97.6 F | SYSTOLIC BLOOD PRESSURE: 123 MMHG | DIASTOLIC BLOOD PRESSURE: 78 MMHG

## 2021-06-21 PROCEDURE — 99213 OFFICE O/P EST LOW 20 MIN: CPT | Mod: 25

## 2021-06-21 PROCEDURE — 51798 US URINE CAPACITY MEASURE: CPT

## 2021-06-22 ENCOUNTER — NON-APPOINTMENT (OUTPATIENT)
Age: 71
End: 2021-06-22

## 2021-06-22 LAB — PSA SERPL-MCNC: 0.91 NG/ML

## 2021-06-22 NOTE — ASSESSMENT
[FreeTextEntry1] : 71yo male with BPH with incomplete bladder emptying\par s/p Greenlight PVP 10/17/19\par PVR to r/o retention: 39cc\par Voiding well, low residual\par PSA 1.7, PSA today \par F/U in 6 months

## 2021-06-22 NOTE — HISTORY OF PRESENT ILLNESS
[Erectile Dysfunction] : Erectile Dysfunction [FreeTextEntry1] : This is a 69yo male here for initial consultation of BPH with obstruction. He has had this problem for many years, starting with episode of acute retention in 2004, 3 months after a hernia repair. He thinks his symptoms may be related to mesh. Also seen in ER 12/2018 for right 3mm distal ureteral stone which he thinks passed. At the time, he switched cardura for tamsulosin. H/o incomplete emptying with residuals around 300cc. Also with h/o recurrent Enteroccous infections. Currently without symptoms. CT in 12/2018 also shows bladder stone. Currently no dysuria, hematuria or flank pain. AUASS = 24. PSA around 2.5 last year. His brother had prostate cancer. \par \par 10/04/19 Here for f/u. C/o worsening retention symptoms, difficulty urinating. Went to outside ER where urine culture was reportedly normal, no other tests performed. He is concerned about developing obstructive uropathy. No recent creatinine available. \par \par 10/23/19 Here for trial of void. Underwent Greenlight PVP last week. Discharged home without Deras but Deras replaced in ER. Also underwent CT scan which shows resolution of small distal ureteral stone. Urine now clear. \par \par 11/20/19 Here for f/u. Passed 4mm ureteral stone following surgery. Stone was 90% calcium phosphate. He has had prior stones in the past. Currently feeling well, wants to discuss stone prevention. \par \par 12/18/19 Here for f/u. Had 24 hr urine testing which is essentially unremarkable. Feels flow is slowing down since surgery. He stopped tamsulosin. \par \par 5/29/20 Here for f/u. Currently feeling well. States he had UTI in April although he only had very mild symptoms. F/u culture negative. Recent PSA = 1.7. \par \par 12/21/20 Here for f/u. Feeling well, no complaints. Had renal bladder US in July which was normal, no stones, no hydronephrosis. \par \par 6/21/21 Here for f/u. C/o seasonal allergies. Voiding well, no urinary complaints. On Tadalafil 5mg for ED. [Nocturia] : no nocturia [Hematuria - Gross] : no gross hematuria [Abdominal Pain] : no abdominal pain [Fever] : no fever [Nausea] : no nausea

## 2021-06-22 NOTE — PHYSICAL EXAM
[General Appearance - Well Developed] : well developed [General Appearance - Well Nourished] : well nourished [Normal Appearance] : normal appearance [Well Groomed] : well groomed [General Appearance - In No Acute Distress] : no acute distress [Abdomen Soft] : soft [Costovertebral Angle Tenderness] : no ~M costovertebral angle tenderness [Abdomen Tenderness] : non-tender [Edema] : no peripheral edema [] : no respiratory distress [Respiration, Rhythm And Depth] : normal respiratory rhythm and effort [Exaggerated Use Of Accessory Muscles For Inspiration] : no accessory muscle use [Oriented To Time, Place, And Person] : oriented to person, place, and time [Affect] : the affect was normal [Mood] : the mood was normal [Not Anxious] : not anxious [Normal Station and Gait] : the gait and station were normal for the patient's age [No Focal Deficits] : no focal deficits [FreeTextEntry1] : PVR = 39cc

## 2021-09-28 ENCOUNTER — TRANSCRIPTION ENCOUNTER (OUTPATIENT)
Age: 71
End: 2021-09-28

## 2021-12-22 ENCOUNTER — APPOINTMENT (OUTPATIENT)
Dept: UROLOGY | Facility: CLINIC | Age: 71
End: 2021-12-22
Payer: MEDICARE

## 2021-12-22 VITALS
BODY MASS INDEX: 20.83 KG/M2 | SYSTOLIC BLOOD PRESSURE: 97 MMHG | HEIGHT: 65 IN | HEART RATE: 94 BPM | TEMPERATURE: 95.9 F | WEIGHT: 125 LBS | DIASTOLIC BLOOD PRESSURE: 60 MMHG

## 2021-12-22 PROCEDURE — 99213 OFFICE O/P EST LOW 20 MIN: CPT | Mod: 25

## 2021-12-22 PROCEDURE — 51798 US URINE CAPACITY MEASURE: CPT

## 2021-12-22 NOTE — PHYSICAL EXAM
[General Appearance - Well Developed] : well developed [General Appearance - Well Nourished] : well nourished [Normal Appearance] : normal appearance [Well Groomed] : well groomed [General Appearance - In No Acute Distress] : no acute distress [Abdomen Soft] : soft [Abdomen Tenderness] : non-tender [Costovertebral Angle Tenderness] : no ~M costovertebral angle tenderness [] : no rash [Oriented To Time, Place, And Person] : oriented to person, place, and time [Affect] : the affect was normal [Mood] : the mood was normal [Not Anxious] : not anxious [Normal Station and Gait] : the gait and station were normal for the patient's age [No Focal Deficits] : no focal deficits [FreeTextEntry1] : PVR = 31cc

## 2021-12-22 NOTE — ASSESSMENT
[FreeTextEntry1] : 72yo male with BPH with incomplete bladder emptying\par s/p Greenlight PVP 10/17/19\par Voiding well, low residual today\par PSA 6/2021 < 1 \par F/U in 6 months

## 2021-12-22 NOTE — HISTORY OF PRESENT ILLNESS
[Erectile Dysfunction] : Erectile Dysfunction [FreeTextEntry1] : This is a 69yo male here for initial consultation of BPH with obstruction. He has had this problem for many years, starting with episode of acute retention in 2004, 3 months after a hernia repair. He thinks his symptoms may be related to mesh. Also seen in ER 12/2018 for right 3mm distal ureteral stone which he thinks passed. At the time, he switched cardura for tamsulosin. H/o incomplete emptying with residuals around 300cc. Also with h/o recurrent Enteroccous infections. Currently without symptoms. CT in 12/2018 also shows bladder stone. Currently no dysuria, hematuria or flank pain. AUASS = 24. PSA around 2.5 last year. His brother had prostate cancer. \par \par 10/04/19 Here for f/u. C/o worsening retention symptoms, difficulty urinating. Went to outside ER where urine culture was reportedly normal, no other tests performed. He is concerned about developing obstructive uropathy. No recent creatinine available. \par \par 10/23/19 Here for trial of void. Underwent Greenlight PVP last week. Discharged home without Deras but Deras replaced in ER. Also underwent CT scan which shows resolution of small distal ureteral stone. Urine now clear. \par \par 11/20/19 Here for f/u. Passed 4mm ureteral stone following surgery. Stone was 90% calcium phosphate. He has had prior stones in the past. Currently feeling well, wants to discuss stone prevention. \par \par 12/18/19 Here for f/u. Had 24 hr urine testing which is essentially unremarkable. Feels flow is slowing down since surgery. He stopped tamsulosin. \par \par 5/29/20 Here for f/u. Currently feeling well. States he had UTI in April although he only had very mild symptoms. F/u culture negative. Recent PSA = 1.7. \par \par 12/21/20 Here for f/u. Feeling well, no complaints. Had renal bladder US in July which was normal, no stones, no hydronephrosis. \par \par 6/21/21 Here for f/u. C/o seasonal allergies. Voiding well, no urinary complaints. On Tadalafil 5mg for ED.\par \par 12/22/21 Here for f/u. Doing well, no urinary complaints. PSA in June < 1.  [Nocturia] : no nocturia [Hematuria - Gross] : no gross hematuria [Abdominal Pain] : no abdominal pain [Fever] : no fever [Nausea] : no nausea

## 2021-12-26 LAB
APPEARANCE: CLEAR
BACTERIA: NEGATIVE
BILIRUBIN URINE: NEGATIVE
BLOOD URINE: NEGATIVE
COLOR: YELLOW
GLUCOSE QUALITATIVE U: NEGATIVE
HYALINE CASTS: 0 /LPF
KETONES URINE: NEGATIVE
LEUKOCYTE ESTERASE URINE: NEGATIVE
MICROSCOPIC-UA: NORMAL
NITRITE URINE: NEGATIVE
PH URINE: 6.5
PROTEIN URINE: NORMAL
RED BLOOD CELLS URINE: 1 /HPF
SPECIFIC GRAVITY URINE: 1.02
SQUAMOUS EPITHELIAL CELLS: 0 /HPF
UROBILINOGEN URINE: NORMAL
WHITE BLOOD CELLS URINE: 1 /HPF

## 2021-12-27 ENCOUNTER — TRANSCRIPTION ENCOUNTER (OUTPATIENT)
Age: 71
End: 2021-12-27

## 2022-06-21 ENCOUNTER — LABORATORY RESULT (OUTPATIENT)
Age: 72
End: 2022-06-21

## 2022-06-22 ENCOUNTER — APPOINTMENT (OUTPATIENT)
Dept: UROLOGY | Facility: CLINIC | Age: 72
End: 2022-06-22
Payer: MEDICARE

## 2022-06-22 VITALS — SYSTOLIC BLOOD PRESSURE: 113 MMHG | TEMPERATURE: 97.9 F | HEART RATE: 66 BPM | DIASTOLIC BLOOD PRESSURE: 73 MMHG

## 2022-06-22 PROCEDURE — 81003 URINALYSIS AUTO W/O SCOPE: CPT | Mod: QW

## 2022-06-22 PROCEDURE — 51798 US URINE CAPACITY MEASURE: CPT

## 2022-06-22 PROCEDURE — 99214 OFFICE O/P EST MOD 30 MIN: CPT | Mod: 25

## 2022-06-22 NOTE — PHYSICAL EXAM
[General Appearance - Well Developed] : well developed [General Appearance - Well Nourished] : well nourished [Normal Appearance] : normal appearance [Well Groomed] : well groomed [General Appearance - In No Acute Distress] : no acute distress [Abdomen Soft] : soft [Abdomen Tenderness] : non-tender [] : no respiratory distress [Respiration, Rhythm And Depth] : normal respiratory rhythm and effort [Exaggerated Use Of Accessory Muscles For Inspiration] : no accessory muscle use [Oriented To Time, Place, And Person] : oriented to person, place, and time [Affect] : the affect was normal [Mood] : the mood was normal [Not Anxious] : not anxious [Normal Station and Gait] : the gait and station were normal for the patient's age [No Focal Deficits] : no focal deficits [FreeTextEntry1] : PVR 76ml

## 2022-06-22 NOTE — HISTORY OF PRESENT ILLNESS
[Straining] : straining [Weak Stream] : weak stream [Abdominal Pain] : abdominal pain [FreeTextEntry1] : This is a 69yo male here for initial consultation of BPH with obstruction. He has had this problem for many years, starting with episode of acute retention in 2004, 3 months after a hernia repair. He thinks his symptoms may be related to mesh. Also seen in ER 12/2018 for right 3mm distal ureteral stone which he thinks passed. At the time, he switched cardura for tamsulosin. H/o incomplete emptying with residuals around 300cc. Also with h/o recurrent Enteroccous infections. Currently without symptoms. CT in 12/2018 also shows bladder stone. Currently no dysuria, hematuria or flank pain. AUASS = 24. PSA around 2.5 last year. His brother had prostate cancer. \par \par 10/04/19 Here for f/u. C/o worsening retention symptoms, difficulty urinating. Went to outside ER where urine culture was reportedly normal, no other tests performed. He is concerned about developing obstructive uropathy. No recent creatinine available. \par \par 10/23/19 Here for trial of void. Underwent Greenlight PVP last week. Discharged home without Deras but Deras replaced in ER. Also underwent CT scan which shows resolution of small distal ureteral stone. Urine now clear. \par \par 11/20/19 Here for f/u. Passed 4mm ureteral stone following surgery. Stone was 90% calcium phosphate. He has had prior stones in the past. Currently feeling well, wants to discuss stone prevention. \par \par 12/18/19 Here for f/u. Had 24 hr urine testing which is essentially unremarkable. Feels flow is slowing down since surgery. He stopped tamsulosin. \par \par 5/29/20 Here for f/u. Currently feeling well. States he had UTI in April although he only had very mild symptoms. F/u culture negative. Recent PSA = 1.7. \par \par 12/21/20 Here for f/u. Feeling well, no complaints. Had renal bladder US in July which was normal, no stones, no hydronephrosis. \par \par 6/21/21 Here for f/u. C/o seasonal allergies. Voiding well, no urinary complaints. On Tadalafil 5mg for ED.\par \par 12/22/21 Here for f/u. Doing well, no urinary complaints. PSA in June < 1. \par \par 6/22/22 Here for f/u. C/o of weak, hesitant stream, and occasional suprapubic discomfort for the past 1 month. Denies hematuria, flank pain, fevers, chills. He is very concerned about possible UTI. Last UTI was in 2020, symptoms were also mild at that time.

## 2022-06-22 NOTE — ASSESSMENT
[FreeTextEntry1] : 70yo male with hx BPH with incomplete bladder emptying, recurrent UTI's, s/p Greenlight PVP 10/17/19.\par Suprapubic discomfort, weak stream for the past month. \par Urine dipstick in office today is neg for nitrates, moderate leuks. \par PVR 76 ml today\par Discussed empiric treatment with patient, he would like to hold abx until result of C&S. \par Patient counseled on RTC cautions. If he develop fevers, chills, confusion, bothersome suprapubic pain, flank pain, or inability to urinate call office or go to ED. \par Ua/Ucx pending. \par PSA 6/2021 < 1. Will repeat when UTI has been ruled out. \par F/U after culture/sensitivity.

## 2022-06-23 LAB
APPEARANCE: ABNORMAL
BILIRUB UR QL STRIP: NORMAL
BILIRUBIN URINE: NEGATIVE
BLOOD URINE: ABNORMAL
CLARITY UR: CLEAR
COLLECTION METHOD: NORMAL
COLOR: YELLOW
GLUCOSE QUALITATIVE U: NEGATIVE
GLUCOSE UR-MCNC: NORMAL
HCG UR QL: 0.2 EU/DL
HGB UR QL STRIP.AUTO: NORMAL
KETONES UR-MCNC: NORMAL
KETONES URINE: NORMAL
LEUKOCYTE ESTERASE UR QL STRIP: NORMAL
LEUKOCYTE ESTERASE URINE: ABNORMAL
NITRITE UR QL STRIP: NORMAL
NITRITE URINE: NEGATIVE
PH UR STRIP: 5.5
PH URINE: 5.5
PROT UR STRIP-MCNC: NORMAL
PROTEIN URINE: NORMAL
SP GR UR STRIP: 1.02
SPECIFIC GRAVITY URINE: 1.02
UROBILINOGEN URINE: NORMAL

## 2022-06-24 RX ORDER — NITROFURANTOIN (MONOHYDRATE/MACROCRYSTALS) 25; 75 MG/1; MG/1
100 CAPSULE ORAL TWICE DAILY
Qty: 14 | Refills: 0 | Status: ACTIVE | COMMUNITY
Start: 2020-05-04 | End: 1900-01-01

## 2022-06-27 ENCOUNTER — NON-APPOINTMENT (OUTPATIENT)
Age: 72
End: 2022-06-27

## 2022-06-27 LAB — BACTERIA UR CULT: ABNORMAL

## 2022-07-05 LAB — PSA SERPL-MCNC: 3.76 NG/ML

## 2022-08-01 ENCOUNTER — APPOINTMENT (OUTPATIENT)
Dept: UROLOGY | Facility: CLINIC | Age: 72
End: 2022-08-01

## 2022-08-01 VITALS — SYSTOLIC BLOOD PRESSURE: 113 MMHG | DIASTOLIC BLOOD PRESSURE: 75 MMHG | HEART RATE: 71 BPM | TEMPERATURE: 97.7 F

## 2022-08-01 DIAGNOSIS — N39.0 URINARY TRACT INFECTION, SITE NOT SPECIFIED: ICD-10-CM

## 2022-08-01 PROCEDURE — 99213 OFFICE O/P EST LOW 20 MIN: CPT | Mod: 25

## 2022-08-01 PROCEDURE — 51798 US URINE CAPACITY MEASURE: CPT

## 2022-08-02 NOTE — HISTORY OF PRESENT ILLNESS
[Straining] : straining [Weak Stream] : weak stream [Abdominal Pain] : abdominal pain [FreeTextEntry1] : This is a 67yo male here for initial consultation of BPH with obstruction. He has had this problem for many years, starting with episode of acute retention in 2004, 3 months after a hernia repair. He thinks his symptoms may be related to mesh. Also seen in ER 12/2018 for right 3mm distal ureteral stone which he thinks passed. At the time, he switched cardura for tamsulosin. H/o incomplete emptying with residuals around 300cc. Also with h/o recurrent Enteroccous infections. Currently without symptoms. CT in 12/2018 also shows bladder stone. Currently no dysuria, hematuria or flank pain. AUASS = 24. PSA around 2.5 last year. His brother had prostate cancer. \par \par 10/04/19 Here for f/u. C/o worsening retention symptoms, difficulty urinating. Went to outside ER where urine culture was reportedly normal, no other tests performed. He is concerned about developing obstructive uropathy. No recent creatinine available. \par \par 10/23/19 Here for trial of void. Underwent Greenlight PVP last week. Discharged home without Deras but Deras replaced in ER. Also underwent CT scan which shows resolution of small distal ureteral stone. Urine now clear. \par \par 11/20/19 Here for f/u. Passed 4mm ureteral stone following surgery. Stone was 90% calcium phosphate. He has had prior stones in the past. Currently feeling well, wants to discuss stone prevention. \par \par 12/18/19 Here for f/u. Had 24 hr urine testing which is essentially unremarkable. Feels flow is slowing down since surgery. He stopped tamsulosin. \par \par 5/29/20 Here for f/u. Currently feeling well. States he had UTI in April although he only had very mild symptoms. F/u culture negative. Recent PSA = 1.7. \par \par 12/21/20 Here for f/u. Feeling well, no complaints. Had renal bladder US in July which was normal, no stones, no hydronephrosis. \par \par 6/21/21 Here for f/u. C/o seasonal allergies. Voiding well, no urinary complaints. On Tadalafil 5mg for ED.\par \par 12/22/21 Here for f/u. Doing well, no urinary complaints. PSA in June < 1. \par \par 6/22/22 Here for f/u. C/o of weak, hesitant stream, and occasional suprapubic discomfort for the past 1 month. Denies hematuria, flank pain, fevers, chills. He is very concerned about possible UTI. Last UTI was in 2020, symptoms were also mild at that time. \par \par 8/1/22 Here for f/u. Reports feeling like he can't empty his bladder.\par last PSA 7/1: 3.76, but was on abx for UTI. Pt reports having recent kidney stones. Renal US done 7/18/22, showing bilateral subcentimeter stones, largest being 0.4cm in left kidney. No hydronephrosis.

## 2022-08-02 NOTE — PHYSICAL EXAM
[General Appearance - Well Developed] : well developed [General Appearance - Well Nourished] : well nourished [Normal Appearance] : normal appearance [Well Groomed] : well groomed [General Appearance - In No Acute Distress] : no acute distress [Abdomen Soft] : soft [Abdomen Tenderness] : non-tender [] : no respiratory distress [Respiration, Rhythm And Depth] : normal respiratory rhythm and effort [Exaggerated Use Of Accessory Muscles For Inspiration] : no accessory muscle use [Oriented To Time, Place, And Person] : oriented to person, place, and time [Affect] : the affect was normal [Mood] : the mood was normal [Not Anxious] : not anxious [Normal Station and Gait] : the gait and station were normal for the patient's age [No Focal Deficits] : no focal deficits [FreeTextEntry1] : PVR 49ml

## 2022-08-02 NOTE — END OF VISIT
[FreeTextEntry3] : I, Dr. Byrd, personally performed the evaluation and management (E/M) services for this established patient who presents today with (a) new problem(s)/exacerbation of (an) existing condition(s).  That E/M includes conducting the examination, assessing all new/exacerbated conditions, and establishing a new plan of care.  Today, our ACP, Nhi Arauz, was here to observe the evaluation and management services for this new problem/exacerbated condition to be followed going forward.\par

## 2022-08-04 LAB — BACTERIA UR CULT: NORMAL

## 2022-08-05 ENCOUNTER — NON-APPOINTMENT (OUTPATIENT)
Age: 72
End: 2022-08-05

## 2022-09-20 NOTE — ASSESSMENT
[FreeTextEntry1] : 70yo male with hx BPH with incomplete bladder emptying, recurrent UTI's, s/p Greenlight PVP 10/17/19.\par PVR today 49 ml today\par \par Recurrent UTI's:\par Last UTI one month ago, treated with nitrofurantoin.\par Repeat urine Cx today\par \par Kidney stones:\par Reviewed US 7/18/22\par Recommends no interventions at this time, as the stones are subcentimeter in size. Pt states he will get second opinion.\par \par Prostate cancer screening:\par Last PSA 7/1/22- 3.76, in the setting of UTI\par Repeat PSA today Area L Indication Text: Tumors in this location are included in Area L (trunk and extremities).  Mohs surgery is indicated for larger tumors, or tumors with aggressive histologic features, in these anatomic locations.

## 2022-10-01 LAB — PSA SERPL-MCNC: 1.28 NG/ML

## 2022-10-03 ENCOUNTER — NON-APPOINTMENT (OUTPATIENT)
Age: 72
End: 2022-10-03

## 2022-11-07 ENCOUNTER — APPOINTMENT (OUTPATIENT)
Dept: UROLOGY | Facility: CLINIC | Age: 72
End: 2022-11-07

## 2022-11-07 VITALS
TEMPERATURE: 97.2 F | BODY MASS INDEX: 21.33 KG/M2 | HEIGHT: 65 IN | WEIGHT: 128 LBS | HEART RATE: 62 BPM | DIASTOLIC BLOOD PRESSURE: 74 MMHG | SYSTOLIC BLOOD PRESSURE: 114 MMHG

## 2022-11-07 DIAGNOSIS — N20.0 CALCULUS OF KIDNEY: ICD-10-CM

## 2022-11-07 PROCEDURE — 51798 US URINE CAPACITY MEASURE: CPT

## 2022-11-07 PROCEDURE — 99213 OFFICE O/P EST LOW 20 MIN: CPT | Mod: 25

## 2022-11-07 NOTE — HISTORY OF PRESENT ILLNESS
[Straining] : straining [Weak Stream] : weak stream [Abdominal Pain] : abdominal pain [FreeTextEntry1] : This is a 69yo male here for initial consultation of BPH with obstruction. He has had this problem for many years, starting with episode of acute retention in 2004, 3 months after a hernia repair. He thinks his symptoms may be related to mesh. Also seen in ER 12/2018 for right 3mm distal ureteral stone which he thinks passed. At the time, he switched cardura for tamsulosin. H/o incomplete emptying with residuals around 300cc. Also with h/o recurrent Enteroccous infections. Currently without symptoms. CT in 12/2018 also shows bladder stone. Currently no dysuria, hematuria or flank pain. AUASS = 24. PSA around 2.5 last year. His brother had prostate cancer. \par \par 10/04/19 Here for f/u. C/o worsening retention symptoms, difficulty urinating. Went to outside ER where urine culture was reportedly normal, no other tests performed. He is concerned about developing obstructive uropathy. No recent creatinine available. \par \par 10/23/19 Here for trial of void. Underwent Greenlight PVP last week. Discharged home without Deras but Deras replaced in ER. Also underwent CT scan which shows resolution of small distal ureteral stone. Urine now clear. \par \par 11/20/19 Here for f/u. Passed 4mm ureteral stone following surgery. Stone was 90% calcium phosphate. He has had prior stones in the past. Currently feeling well, wants to discuss stone prevention. \par \par 12/18/19 Here for f/u. Had 24 hr urine testing which is essentially unremarkable. Feels flow is slowing down since surgery. He stopped tamsulosin. \par \par 5/29/20 Here for f/u. Currently feeling well. States he had UTI in April although he only had very mild symptoms. F/u culture negative. Recent PSA = 1.7. \par \par 12/21/20 Here for f/u. Feeling well, no complaints. Had renal bladder US in July which was normal, no stones, no hydronephrosis. \par \par 6/21/21 Here for f/u. C/o seasonal allergies. Voiding well, no urinary complaints. On Tadalafil 5mg for ED.\par \par 12/22/21 Here for f/u. Doing well, no urinary complaints. PSA in June < 1. \par \par 6/22/22 Here for f/u. C/o of weak, hesitant stream, and occasional suprapubic discomfort for the past 1 month. Denies hematuria, flank pain, fevers, chills. He is very concerned about possible UTI. Last UTI was in 2020, symptoms were also mild at that time. \par \par 8/1/22 Here for f/u. Reports feeling like he can't empty his bladder.\par last PSA 7/1: 3.76, but was on abx for UTI. Pt reports having recent kidney stones. Renal US done 7/18/22, showing bilateral subcentimeter stones, largest being 0.4cm in left kidney. No hydronephrosis.\par \par 11/7/22 Here for f/u. No interval changes. PSA 9/2022 = 1.28

## 2022-11-07 NOTE — PHYSICAL EXAM
[General Appearance - Well Developed] : well developed [General Appearance - Well Nourished] : well nourished [Normal Appearance] : normal appearance [Well Groomed] : well groomed [General Appearance - In No Acute Distress] : no acute distress [Abdomen Soft] : soft [Abdomen Tenderness] : non-tender [] : no respiratory distress [Respiration, Rhythm And Depth] : normal respiratory rhythm and effort [Exaggerated Use Of Accessory Muscles For Inspiration] : no accessory muscle use [Oriented To Time, Place, And Person] : oriented to person, place, and time [Affect] : the affect was normal [Mood] : the mood was normal [Not Anxious] : not anxious [Normal Station and Gait] : the gait and station were normal for the patient's age [No Focal Deficits] : no focal deficits [FreeTextEntry1] : PVR = 163 ml

## 2022-11-07 NOTE — ASSESSMENT
[FreeTextEntry1] : 71yo male with hx BPH with incomplete bladder emptying, recurrent UTI's, s/p Greenlight PVP 10/17/19.\par PVR today 163 ml today but he says he drank about 3 liters of water today before his visit \par \par Recurrent UTI's:\par Last UTI 7/2022\par Repeat culture today\par \par Kidney stones:\par US 7/18/22 small non obstructing stones, currently being observed \par \par Prostate cancer screening:\par Last PSA 7/1/22- 3.76, in the setting of UTI\par Repeat PSA today 9/2022 reviewed, < 2 \par Continue observation\par \par F/u 6 months

## 2022-11-08 ENCOUNTER — NON-APPOINTMENT (OUTPATIENT)
Age: 72
End: 2022-11-08

## 2022-11-08 LAB
APPEARANCE: CLEAR
BACTERIA: NEGATIVE
BILIRUBIN URINE: NEGATIVE
BLOOD URINE: NEGATIVE
COLOR: NORMAL
GLUCOSE QUALITATIVE U: NEGATIVE
HYALINE CASTS: 0 /LPF
KETONES URINE: NEGATIVE
LEUKOCYTE ESTERASE URINE: NEGATIVE
MICROSCOPIC-UA: NORMAL
NITRITE URINE: NEGATIVE
PH URINE: 6.5
PROTEIN URINE: NEGATIVE
RED BLOOD CELLS URINE: 1 /HPF
SPECIFIC GRAVITY URINE: 1.01
SQUAMOUS EPITHELIAL CELLS: 0 /HPF
UROBILINOGEN URINE: NORMAL
WHITE BLOOD CELLS URINE: 0 /HPF

## 2022-11-09 LAB — BACTERIA UR CULT: NORMAL

## 2023-05-31 ENCOUNTER — APPOINTMENT (OUTPATIENT)
Dept: UROLOGY | Facility: CLINIC | Age: 73
End: 2023-05-31
Payer: MEDICARE

## 2023-05-31 VITALS
HEIGHT: 65 IN | WEIGHT: 126 LBS | DIASTOLIC BLOOD PRESSURE: 78 MMHG | BODY MASS INDEX: 20.99 KG/M2 | HEART RATE: 90 BPM | SYSTOLIC BLOOD PRESSURE: 114 MMHG | TEMPERATURE: 98.3 F

## 2023-05-31 PROCEDURE — 99213 OFFICE O/P EST LOW 20 MIN: CPT | Mod: 25

## 2023-05-31 PROCEDURE — 51798 US URINE CAPACITY MEASURE: CPT

## 2023-05-31 NOTE — ASSESSMENT
[FreeTextEntry1] : 71yo male with hx BPH with incomplete bladder emptying, recurrent UTI's, s/p Greenlight PVP 10/17/19.\par PVR today 150 ml today but he says he drank about 3 liters of water today before his visit \par \par Recurrent UTI's:\par Last UTI 7/2022\par Repeat culture today\par \par Kidney stones:\par  7/18/22 small non obstructing stones, currently being observed \par Followed by Dr. Brown at [facility]\par \par Prostate cancer screening:\par Last PSA 9/2022 reviewed, < 2 \par Continue observation\par \par F/u 6 months

## 2023-05-31 NOTE — PHYSICAL EXAM
[General Appearance - Well Developed] : well developed [General Appearance - Well Nourished] : well nourished [Normal Appearance] : normal appearance [Well Groomed] : well groomed [General Appearance - In No Acute Distress] : no acute distress [Abdomen Soft] : soft [Abdomen Tenderness] : non-tender [] : no respiratory distress [Respiration, Rhythm And Depth] : normal respiratory rhythm and effort [Exaggerated Use Of Accessory Muscles For Inspiration] : no accessory muscle use [Oriented To Time, Place, And Person] : oriented to person, place, and time [Affect] : the affect was normal [Mood] : the mood was normal [Not Anxious] : not anxious [Normal Station and Gait] : the gait and station were normal for the patient's age [No Focal Deficits] : no focal deficits [FreeTextEntry1] : PVR = 150 ml

## 2023-05-31 NOTE — HISTORY OF PRESENT ILLNESS
[Straining] : straining [Weak Stream] : weak stream [Abdominal Pain] : abdominal pain [FreeTextEntry1] : This is a 69yo male here for initial consultation of BPH with obstruction. He has had this problem for many years, starting with episode of acute retention in 2004, 3 months after a hernia repair. He thinks his symptoms may be related to mesh. Also seen in ER 12/2018 for right 3mm distal ureteral stone which he thinks passed. At the time, he switched cardura for tamsulosin. H/o incomplete emptying with residuals around 300cc. Also with h/o recurrent Enteroccous infections. Currently without symptoms. CT in 12/2018 also shows bladder stone. Currently no dysuria, hematuria or flank pain. AUASS = 24. PSA around 2.5 last year. His brother had prostate cancer. \par \par 10/04/19 Here for f/u. C/o worsening retention symptoms, difficulty urinating. Went to outside ER where urine culture was reportedly normal, no other tests performed. He is concerned about developing obstructive uropathy. No recent creatinine available. \par \par 10/23/19 Here for trial of void. Underwent Greenlight PVP last week. Discharged home without Deras but Deras replaced in ER. Also underwent CT scan which shows resolution of small distal ureteral stone. Urine now clear. \par \par 11/20/19 Here for f/u. Passed 4mm ureteral stone following surgery. Stone was 90% calcium phosphate. He has had prior stones in the past. Currently feeling well, wants to discuss stone prevention. \par \par 12/18/19 Here for f/u. Had 24 hr urine testing which is essentially unremarkable. Feels flow is slowing down since surgery. He stopped tamsulosin. \par \par 5/29/20 Here for f/u. Currently feeling well. States he had UTI in April although he only had very mild symptoms. F/u culture negative. Recent PSA = 1.7. \par \par 12/21/20 Here for f/u. Feeling well, no complaints. Had renal bladder US in July which was normal, no stones, no hydronephrosis. \par \par 6/21/21 Here for f/u. C/o seasonal allergies. Voiding well, no urinary complaints. On Tadalafil 5mg for ED.\par \par 12/22/21 Here for f/u. Doing well, no urinary complaints. PSA in June < 1. \par \par 6/22/22 Here for f/u. C/o of weak, hesitant stream, and occasional suprapubic discomfort for the past 1 month. Denies hematuria, flank pain, fevers, chills. He is very concerned about possible UTI. Last UTI was in 2020, symptoms were also mild at that time. \par \par 8/1/22 Here for f/u. Reports feeling like he can't empty his bladder.\par last PSA 7/1: 3.76, but was on abx for UTI. Pt reports having recent kidney stones. Renal US done 7/18/22, showing bilateral subcentimeter stones, largest being 0.4cm in left kidney. No hydronephrosis.\par \par 11/7/22 Here for f/u. No interval changes. PSA 9/2022 = 1.28\par \par 5/31/23 Here for f/u. Recent sinus infection complicated by C.diff from taking Augmentin.

## 2023-06-01 LAB
APPEARANCE: CLEAR
BACTERIA: NEGATIVE /HPF
BILIRUBIN URINE: NEGATIVE
BLOOD URINE: NEGATIVE
CAST: 0 /LPF
COLOR: YELLOW
EPITHELIAL CELLS: 0 /HPF
GLUCOSE QUALITATIVE U: NEGATIVE MG/DL
KETONES URINE: NEGATIVE MG/DL
LEUKOCYTE ESTERASE URINE: NEGATIVE
MICROSCOPIC-UA: NORMAL
NITRITE URINE: NEGATIVE
PH URINE: 6.5
PROTEIN URINE: NEGATIVE MG/DL
RED BLOOD CELLS URINE: 0 /HPF
SPECIFIC GRAVITY URINE: 1.01
UROBILINOGEN URINE: 0.2 MG/DL
WHITE BLOOD CELLS URINE: 0 /HPF

## 2023-06-02 LAB — BACTERIA UR CULT: NORMAL

## 2023-06-05 ENCOUNTER — NON-APPOINTMENT (OUTPATIENT)
Age: 73
End: 2023-06-05

## 2023-06-14 NOTE — ED ADULT NURSE NOTE - FINAL NURSING ELECTRONIC SIGNATURE
[FreeTextEntry1] : Jeremie he has a long history of chronic ischemic heart disease status post bypass surgery, exertional shortness of breath with mild degrees of exertion even after the bypass which has not changed for many years.  He has some element of diastolic dysfunction inferior wall hypokinesia and mild to moderate aortic stenosis on his last echocardiogram  and status post pacemaker for new left bundle branch block and fatigue.  He had improved with less shortness of breath but recently had COVID-19 and urinary tract infection and colonoscopy with polypectomy.   presently he has had progressive shortness of breath now with minimal exertion and edema of his lower extremities and extreme fatigue.   he developed progressive aortic stenosis on echo and is now status post transaortic valve replacement with a well-functioning bioprosthetic valve on 2D echo.  from a cardiac point of view he has been relatively stable with improved and less shortness of breath though he still feels short of breath and still has some edema of his lower extremities.  He had a brief episode of A-fib today on  June 14th but it is now resolved.\par \par \par 1.  Chronic ischemic heart disease status post bypass surgery exertional shortness of breath and exertional chest pressure somewhat increased from last visit\par 2.  Chronic edema of the lower extremities probably related to some degree of diastolic dysfunction on Lasix\par 3.  Polycythemia vera new diagnosis on hydroxyurea followed by Dr. Baum\par 4.  Hyperlipidemia LDL way below 70 on small dose of statin\par 5.    Status post transaortic valve valve functioning normal\par 6.    Status post pacemaker\par 7.    still short of breath and still has edema despite the transaortic valve replacement though he is improved.  He is hemodynamically stable \par  8.  Prostate carcinoma to probably undergo [employer]\par  01-Dec-2018 09:45

## 2023-06-19 ENCOUNTER — NON-APPOINTMENT (OUTPATIENT)
Age: 73
End: 2023-06-19

## 2023-06-19 ENCOUNTER — APPOINTMENT (OUTPATIENT)
Dept: OTOLARYNGOLOGY | Facility: CLINIC | Age: 73
End: 2023-06-19
Payer: MEDICARE

## 2023-06-19 VITALS
DIASTOLIC BLOOD PRESSURE: 81 MMHG | HEART RATE: 73 BPM | HEIGHT: 64 IN | SYSTOLIC BLOOD PRESSURE: 113 MMHG | TEMPERATURE: 97.3 F | BODY MASS INDEX: 21.34 KG/M2 | WEIGHT: 125 LBS

## 2023-06-19 DIAGNOSIS — J34.3 HYPERTROPHY OF NASAL TURBINATES: ICD-10-CM

## 2023-06-19 PROCEDURE — 31231 NASAL ENDOSCOPY DX: CPT

## 2023-06-19 PROCEDURE — 99204 OFFICE O/P NEW MOD 45 MIN: CPT | Mod: 25

## 2023-06-19 RX ORDER — AZELASTINE HYDROCHLORIDE 137 UG/1
137 SPRAY, METERED NASAL
Qty: 1 | Refills: 2 | Status: ACTIVE | COMMUNITY
Start: 2023-06-19 | End: 1900-01-01

## 2023-06-19 NOTE — ASSESSMENT
[FreeTextEntry1] : 72 year old male presents with concern for sinusitis recently treated with 2 courses of antibiotics. On nasal endoscopy, there is evidence of turbinate hypertrophy and nasal septal deviation. Based on his history and exam findings, I am recommending continuing nasal saline rinses. We will avoid nasal steroids due to history of glaucoma, instead I recommend Azelastine nasal spray. I am also recommending CT Sinus. Follow up after to review results and discuss next steps. \par \par - nasal saline rinses and Azelastine\par - CT Sinus\par - fu to review results and discuss next steps

## 2023-06-19 NOTE — PROCEDURE
[FreeTextEntry6] : -\par Nasal Endoscopy Procedure Note\par \par Pre-operative Diagnosis: post nasal drip \par Post-operative Diagnosis: nasal septal deviation and turbinate hypertrophy \par Anesthesia: Topical\par Procedure: Bilateral nasal endoscopy\par  \par Procedure Details: \par After topical anesthesia and decongestant, the patient was placed in the supine position. The telescope was passed along the left nasal floor to the nasopharynx. It was then passed into the region of the middle meatus, middle turbinate, and the sphenoethmoid region. An identical procedure was performed on the right side. \par  \par Findings: \par Mucosa: 	 normal	\par Nasal septum: deviated to left\par Discharge: 	none	\par Turbinates: 	hypertrophied \par Adenoid: 	 normal	\par Posterior choanae: 	normal	\par Eustachian tubes: 	normal	\par Mucous stranding: 	normal 	\par Lesions: 	 Not present	\par  \par Comments: \par Condition: Stable. Patient tolerated procedure well.\par

## 2023-06-19 NOTE — PHYSICAL EXAM
[] : septum deviated to the left [Midline] : trachea located in midline position [Normal] : no rashes [de-identified] : hypertrophied

## 2023-06-19 NOTE — HISTORY OF PRESENT ILLNESS
[de-identified] : 6/19/23\par 72M presents with concern for sinusitis. He is coughing up pus and has post nasal drip. Symptoms started several years ago. Denies nasal congestion, facial pressure, changes in sense of taste or smell or dental pain. \par \par He's seen 2 ENT's in the past. 2-3 months ago, was treated with Doxycycline and Augmentin for a sinus infection. He's been using a neti pot and saline spray. He also had an MRI Sinus with evidence of inflammatory mucosal thickening seen in almost all paranasal sinuses, particular opacification of right maxilary sinus.

## 2023-10-30 ENCOUNTER — APPOINTMENT (OUTPATIENT)
Dept: UROLOGY | Facility: CLINIC | Age: 73
End: 2023-10-30
Payer: MEDICARE

## 2023-10-30 VITALS
BODY MASS INDEX: 21.34 KG/M2 | HEART RATE: 73 BPM | SYSTOLIC BLOOD PRESSURE: 126 MMHG | WEIGHT: 125 LBS | HEIGHT: 64 IN | DIASTOLIC BLOOD PRESSURE: 78 MMHG | TEMPERATURE: 97.6 F

## 2023-10-30 PROCEDURE — 99213 OFFICE O/P EST LOW 20 MIN: CPT | Mod: 25

## 2023-10-30 PROCEDURE — 51798 US URINE CAPACITY MEASURE: CPT

## 2023-10-31 ENCOUNTER — NON-APPOINTMENT (OUTPATIENT)
Age: 73
End: 2023-10-31

## 2023-10-31 LAB
APPEARANCE: CLEAR
BACTERIA: NEGATIVE /HPF
BILIRUBIN URINE: NEGATIVE
BLOOD URINE: NEGATIVE
CAST: 0 /LPF
COLOR: YELLOW
EPITHELIAL CELLS: 0 /HPF
GLUCOSE QUALITATIVE U: NEGATIVE MG/DL
KETONES URINE: NEGATIVE MG/DL
LEUKOCYTE ESTERASE URINE: NEGATIVE
MICROSCOPIC-UA: NORMAL
NITRITE URINE: NEGATIVE
PH URINE: 6
PROTEIN URINE: NEGATIVE MG/DL
PSA SERPL-MCNC: 1.16 NG/ML
RED BLOOD CELLS URINE: 2 /HPF
SPECIFIC GRAVITY URINE: 1.02
UROBILINOGEN URINE: 0.2 MG/DL
WHITE BLOOD CELLS URINE: 1 /HPF

## 2023-11-02 LAB — BACTERIA UR CULT: NORMAL

## 2023-12-07 ENCOUNTER — EMERGENCY (EMERGENCY)
Facility: HOSPITAL | Age: 73
LOS: 1 days | Discharge: HOME | End: 2023-12-07
Attending: EMERGENCY MEDICINE | Admitting: EMERGENCY MEDICINE
Payer: MEDICARE

## 2023-12-07 VITALS
TEMPERATURE: 98 F | SYSTOLIC BLOOD PRESSURE: 127 MMHG | WEIGHT: 134.92 LBS | RESPIRATION RATE: 18 BRPM | OXYGEN SATURATION: 96 % | HEART RATE: 82 BPM | DIASTOLIC BLOOD PRESSURE: 87 MMHG

## 2023-12-07 VITALS
DIASTOLIC BLOOD PRESSURE: 83 MMHG | SYSTOLIC BLOOD PRESSURE: 119 MMHG | OXYGEN SATURATION: 99 % | HEART RATE: 56 BPM | RESPIRATION RATE: 16 BRPM | TEMPERATURE: 97 F

## 2023-12-07 DIAGNOSIS — R42 DIZZINESS AND GIDDINESS: ICD-10-CM

## 2023-12-07 DIAGNOSIS — R07.81 PLEURODYNIA: ICD-10-CM

## 2023-12-07 DIAGNOSIS — Z98.890 OTHER SPECIFIED POSTPROCEDURAL STATES: Chronic | ICD-10-CM

## 2023-12-07 DIAGNOSIS — S22.42XA MULTIPLE FRACTURES OF RIBS, LEFT SIDE, INITIAL ENCOUNTER FOR CLOSED FRACTURE: ICD-10-CM

## 2023-12-07 DIAGNOSIS — K08.409 PARTIAL LOSS OF TEETH, UNSPECIFIED CAUSE, UNSPECIFIED CLASS: Chronic | ICD-10-CM

## 2023-12-07 DIAGNOSIS — R55 SYNCOPE AND COLLAPSE: ICD-10-CM
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
## 2023-12-07 DIAGNOSIS — E78.5 HYPERLIPIDEMIA, UNSPECIFIED: ICD-10-CM

## 2023-12-07 DIAGNOSIS — N40.0 BENIGN PROSTATIC HYPERPLASIA WITHOUT LOWER URINARY TRACT SYMPTOMS: ICD-10-CM

## 2023-12-07 LAB
ALBUMIN SERPL ELPH-MCNC: 3.5 G/DL — SIGNIFICANT CHANGE UP (ref 3.4–5)
ALBUMIN SERPL ELPH-MCNC: 3.5 G/DL — SIGNIFICANT CHANGE UP (ref 3.4–5)
ALP SERPL-CCNC: 83 U/L — SIGNIFICANT CHANGE UP (ref 40–120)
ALP SERPL-CCNC: 83 U/L — SIGNIFICANT CHANGE UP (ref 40–120)
ALT FLD-CCNC: 9 U/L — LOW (ref 12–42)
ALT FLD-CCNC: 9 U/L — LOW (ref 12–42)
ANION GAP SERPL CALC-SCNC: 7 MMOL/L — LOW (ref 9–16)
ANION GAP SERPL CALC-SCNC: 7 MMOL/L — LOW (ref 9–16)
APPEARANCE UR: CLEAR — SIGNIFICANT CHANGE UP
APPEARANCE UR: CLEAR — SIGNIFICANT CHANGE UP
AST SERPL-CCNC: 24 U/L — SIGNIFICANT CHANGE UP (ref 15–37)
AST SERPL-CCNC: 24 U/L — SIGNIFICANT CHANGE UP (ref 15–37)
BACTERIA # UR AUTO: PRESENT /HPF — SIGNIFICANT CHANGE UP
BACTERIA # UR AUTO: PRESENT /HPF — SIGNIFICANT CHANGE UP
BASOPHILS # BLD AUTO: 0.01 K/UL — SIGNIFICANT CHANGE UP (ref 0–0.2)
BASOPHILS # BLD AUTO: 0.01 K/UL — SIGNIFICANT CHANGE UP (ref 0–0.2)
BASOPHILS NFR BLD AUTO: 0.2 % — SIGNIFICANT CHANGE UP (ref 0–2)
BASOPHILS NFR BLD AUTO: 0.2 % — SIGNIFICANT CHANGE UP (ref 0–2)
BILIRUB SERPL-MCNC: 0.4 MG/DL — SIGNIFICANT CHANGE UP (ref 0.2–1.2)
BILIRUB SERPL-MCNC: 0.4 MG/DL — SIGNIFICANT CHANGE UP (ref 0.2–1.2)
BILIRUB UR-MCNC: NEGATIVE — SIGNIFICANT CHANGE UP
BILIRUB UR-MCNC: NEGATIVE — SIGNIFICANT CHANGE UP
BUN SERPL-MCNC: 17 MG/DL — SIGNIFICANT CHANGE UP (ref 7–23)
BUN SERPL-MCNC: 17 MG/DL — SIGNIFICANT CHANGE UP (ref 7–23)
CALCIUM SERPL-MCNC: 9 MG/DL — SIGNIFICANT CHANGE UP (ref 8.5–10.5)
CALCIUM SERPL-MCNC: 9 MG/DL — SIGNIFICANT CHANGE UP (ref 8.5–10.5)
CHLORIDE SERPL-SCNC: 94 MMOL/L — LOW (ref 96–108)
CHLORIDE SERPL-SCNC: 94 MMOL/L — LOW (ref 96–108)
CO2 SERPL-SCNC: 29 MMOL/L — SIGNIFICANT CHANGE UP (ref 22–31)
CO2 SERPL-SCNC: 29 MMOL/L — SIGNIFICANT CHANGE UP (ref 22–31)
COLOR SPEC: YELLOW — SIGNIFICANT CHANGE UP
COLOR SPEC: YELLOW — SIGNIFICANT CHANGE UP
COMMENT - URINE: SIGNIFICANT CHANGE UP
COMMENT - URINE: SIGNIFICANT CHANGE UP
CREAT SERPL-MCNC: 0.82 MG/DL — SIGNIFICANT CHANGE UP (ref 0.5–1.3)
CREAT SERPL-MCNC: 0.82 MG/DL — SIGNIFICANT CHANGE UP (ref 0.5–1.3)
DIFF PNL FLD: NEGATIVE — SIGNIFICANT CHANGE UP
DIFF PNL FLD: NEGATIVE — SIGNIFICANT CHANGE UP
EGFR: 93 ML/MIN/1.73M2 — SIGNIFICANT CHANGE UP
EGFR: 93 ML/MIN/1.73M2 — SIGNIFICANT CHANGE UP
EOSINOPHIL # BLD AUTO: 0.02 K/UL — SIGNIFICANT CHANGE UP (ref 0–0.5)
EOSINOPHIL # BLD AUTO: 0.02 K/UL — SIGNIFICANT CHANGE UP (ref 0–0.5)
EOSINOPHIL NFR BLD AUTO: 0.4 % — SIGNIFICANT CHANGE UP (ref 0–6)
EOSINOPHIL NFR BLD AUTO: 0.4 % — SIGNIFICANT CHANGE UP (ref 0–6)
EPI CELLS # UR: PRESENT
EPI CELLS # UR: PRESENT
GLUCOSE SERPL-MCNC: 133 MG/DL — HIGH (ref 70–99)
GLUCOSE SERPL-MCNC: 133 MG/DL — HIGH (ref 70–99)
GLUCOSE UR QL: NEGATIVE MG/DL — SIGNIFICANT CHANGE UP
GLUCOSE UR QL: NEGATIVE MG/DL — SIGNIFICANT CHANGE UP
HCT VFR BLD CALC: 39.6 % — SIGNIFICANT CHANGE UP (ref 39–50)
HCT VFR BLD CALC: 39.6 % — SIGNIFICANT CHANGE UP (ref 39–50)
HGB BLD-MCNC: 13.6 G/DL — SIGNIFICANT CHANGE UP (ref 13–17)
HGB BLD-MCNC: 13.6 G/DL — SIGNIFICANT CHANGE UP (ref 13–17)
HYALINE CASTS # UR AUTO: PRESENT
HYALINE CASTS # UR AUTO: PRESENT
IMM GRANULOCYTES NFR BLD AUTO: 0.2 % — SIGNIFICANT CHANGE UP (ref 0–0.9)
IMM GRANULOCYTES NFR BLD AUTO: 0.2 % — SIGNIFICANT CHANGE UP (ref 0–0.9)
KETONES UR-MCNC: ABNORMAL MG/DL
KETONES UR-MCNC: ABNORMAL MG/DL
LEUKOCYTE ESTERASE UR-ACNC: ABNORMAL
LEUKOCYTE ESTERASE UR-ACNC: ABNORMAL
LYMPHOCYTES # BLD AUTO: 1.55 K/UL — SIGNIFICANT CHANGE UP (ref 1–3.3)
LYMPHOCYTES # BLD AUTO: 1.55 K/UL — SIGNIFICANT CHANGE UP (ref 1–3.3)
LYMPHOCYTES # BLD AUTO: 31.6 % — SIGNIFICANT CHANGE UP (ref 13–44)
LYMPHOCYTES # BLD AUTO: 31.6 % — SIGNIFICANT CHANGE UP (ref 13–44)
MAGNESIUM SERPL-MCNC: 2.1 MG/DL — SIGNIFICANT CHANGE UP (ref 1.6–2.6)
MAGNESIUM SERPL-MCNC: 2.1 MG/DL — SIGNIFICANT CHANGE UP (ref 1.6–2.6)
MCHC RBC-ENTMCNC: 30.7 PG — SIGNIFICANT CHANGE UP (ref 27–34)
MCHC RBC-ENTMCNC: 30.7 PG — SIGNIFICANT CHANGE UP (ref 27–34)
MCHC RBC-ENTMCNC: 34.3 GM/DL — SIGNIFICANT CHANGE UP (ref 32–36)
MCHC RBC-ENTMCNC: 34.3 GM/DL — SIGNIFICANT CHANGE UP (ref 32–36)
MCV RBC AUTO: 89.4 FL — SIGNIFICANT CHANGE UP (ref 80–100)
MCV RBC AUTO: 89.4 FL — SIGNIFICANT CHANGE UP (ref 80–100)
MONOCYTES # BLD AUTO: 0.51 K/UL — SIGNIFICANT CHANGE UP (ref 0–0.9)
MONOCYTES # BLD AUTO: 0.51 K/UL — SIGNIFICANT CHANGE UP (ref 0–0.9)
MONOCYTES NFR BLD AUTO: 10.4 % — SIGNIFICANT CHANGE UP (ref 2–14)
MONOCYTES NFR BLD AUTO: 10.4 % — SIGNIFICANT CHANGE UP (ref 2–14)
NEUTROPHILS # BLD AUTO: 2.8 K/UL — SIGNIFICANT CHANGE UP (ref 1.8–7.4)
NEUTROPHILS # BLD AUTO: 2.8 K/UL — SIGNIFICANT CHANGE UP (ref 1.8–7.4)
NEUTROPHILS NFR BLD AUTO: 57.2 % — SIGNIFICANT CHANGE UP (ref 43–77)
NEUTROPHILS NFR BLD AUTO: 57.2 % — SIGNIFICANT CHANGE UP (ref 43–77)
NITRITE UR-MCNC: NEGATIVE — SIGNIFICANT CHANGE UP
NITRITE UR-MCNC: NEGATIVE — SIGNIFICANT CHANGE UP
NRBC # BLD: 0 /100 WBCS — SIGNIFICANT CHANGE UP (ref 0–0)
NRBC # BLD: 0 /100 WBCS — SIGNIFICANT CHANGE UP (ref 0–0)
PH UR: 6 — SIGNIFICANT CHANGE UP (ref 5–8)
PH UR: 6 — SIGNIFICANT CHANGE UP (ref 5–8)
PLATELET # BLD AUTO: 128 K/UL — LOW (ref 150–400)
PLATELET # BLD AUTO: 128 K/UL — LOW (ref 150–400)
POTASSIUM SERPL-MCNC: 3.8 MMOL/L — SIGNIFICANT CHANGE UP (ref 3.5–5.3)
POTASSIUM SERPL-MCNC: 3.8 MMOL/L — SIGNIFICANT CHANGE UP (ref 3.5–5.3)
POTASSIUM SERPL-SCNC: 3.8 MMOL/L — SIGNIFICANT CHANGE UP (ref 3.5–5.3)
POTASSIUM SERPL-SCNC: 3.8 MMOL/L — SIGNIFICANT CHANGE UP (ref 3.5–5.3)
PROT SERPL-MCNC: 7 G/DL — SIGNIFICANT CHANGE UP (ref 6.4–8.2)
PROT SERPL-MCNC: 7 G/DL — SIGNIFICANT CHANGE UP (ref 6.4–8.2)
PROT UR-MCNC: ABNORMAL MG/DL
PROT UR-MCNC: ABNORMAL MG/DL
RBC # BLD: 4.43 M/UL — SIGNIFICANT CHANGE UP (ref 4.2–5.8)
RBC # BLD: 4.43 M/UL — SIGNIFICANT CHANGE UP (ref 4.2–5.8)
RBC # FLD: 14 % — SIGNIFICANT CHANGE UP (ref 10.3–14.5)
RBC # FLD: 14 % — SIGNIFICANT CHANGE UP (ref 10.3–14.5)
RBC CASTS # UR COMP ASSIST: 0 /HPF — SIGNIFICANT CHANGE UP (ref 0–4)
RBC CASTS # UR COMP ASSIST: 0 /HPF — SIGNIFICANT CHANGE UP (ref 0–4)
SODIUM SERPL-SCNC: 130 MMOL/L — LOW (ref 132–145)
SODIUM SERPL-SCNC: 130 MMOL/L — LOW (ref 132–145)
SP GR SPEC: 1.02 — SIGNIFICANT CHANGE UP (ref 1–1.03)
SP GR SPEC: 1.02 — SIGNIFICANT CHANGE UP (ref 1–1.03)
TROPONIN I, HIGH SENSITIVITY RESULT: 8.8 NG/L — SIGNIFICANT CHANGE UP
TROPONIN I, HIGH SENSITIVITY RESULT: 8.8 NG/L — SIGNIFICANT CHANGE UP
UROBILINOGEN FLD QL: 1 MG/DL — SIGNIFICANT CHANGE UP (ref 0.2–1)
UROBILINOGEN FLD QL: 1 MG/DL — SIGNIFICANT CHANGE UP (ref 0.2–1)
WBC # BLD: 4.9 K/UL — SIGNIFICANT CHANGE UP (ref 3.8–10.5)
WBC # BLD: 4.9 K/UL — SIGNIFICANT CHANGE UP (ref 3.8–10.5)
WBC # FLD AUTO: 4.9 K/UL — SIGNIFICANT CHANGE UP (ref 3.8–10.5)
WBC # FLD AUTO: 4.9 K/UL — SIGNIFICANT CHANGE UP (ref 3.8–10.5)
WBC UR QL: 6 /HPF — HIGH (ref 0–5)
WBC UR QL: 6 /HPF — HIGH (ref 0–5)

## 2023-12-07 PROCEDURE — 70450 CT HEAD/BRAIN W/O DYE: CPT | Mod: 26,MH

## 2023-12-07 PROCEDURE — 71260 CT THORAX DX C+: CPT | Mod: 26,MH

## 2023-12-07 PROCEDURE — 99285 EMERGENCY DEPT VISIT HI MDM: CPT | Mod: FS

## 2023-12-07 PROCEDURE — 74177 CT ABD & PELVIS W/CONTRAST: CPT | Mod: 26,MH

## 2023-12-07 PROCEDURE — 72125 CT NECK SPINE W/O DYE: CPT | Mod: 26,MH

## 2023-12-07 RX ORDER — KETOROLAC TROMETHAMINE 30 MG/ML
15 SYRINGE (ML) INJECTION ONCE
Refills: 0 | Status: DISCONTINUED | OUTPATIENT
Start: 2023-12-07 | End: 2023-12-07

## 2023-12-07 RX ORDER — SODIUM CHLORIDE 9 MG/ML
1000 INJECTION INTRAMUSCULAR; INTRAVENOUS; SUBCUTANEOUS ONCE
Refills: 0 | Status: COMPLETED | OUTPATIENT
Start: 2023-12-07 | End: 2023-12-07

## 2023-12-07 RX ADMIN — Medication 15 MILLIGRAM(S): at 22:46

## 2023-12-07 RX ADMIN — Medication 15 MILLIGRAM(S): at 18:26

## 2023-12-07 RX ADMIN — Medication 15 MILLIGRAM(S): at 20:49

## 2023-12-07 NOTE — ED PROVIDER NOTE - NSICDXPASTMEDICALHX_GEN_ALL_CORE_FT
PAST MEDICAL HISTORY:  Anxiety     Asthma     BPH with obstruction/lower urinary tract symptoms     Chronic bacterial prostatitis     History of hepatitis B     History of kidney stones     HLD (hyperlipidemia)     MARCIA (obstructive sleep apnea) mouth guard    Thrombocytopenia

## 2023-12-07 NOTE — ED ADULT NURSE REASSESSMENT NOTE - NS ED NURSE REASSESS COMMENT FT1
Pt given incentive spirometer and provided teaching on how to use it  able to demonstrate teach back

## 2023-12-07 NOTE — ED ADULT NURSE NOTE - OBJECTIVE STATEMENT
Patient c/o pain to left lower back s/p syncopal episode last night while urinating. He broke ribs 7&8. No bruising or redness or any obvious signs of trauma to area. Patient denied hitting head, denied blood thinner. Denied feeling lightheaded, hot, dizzy before syncope. Currently resting. Denied CP, SOB, dizziness.

## 2023-12-07 NOTE — ED ADULT TRIAGE NOTE - CHIEF COMPLAINT QUOTE
pt had a syncopal episode last night while urinating and landed onto the left side of ribs. pt went to  which showed left sided rib fractures to 7 and 8. pt c/o increasing pain not relieved by pain meds

## 2023-12-07 NOTE — ED PROVIDER NOTE - PROGRESS NOTE DETAILS
ck: discussed case with [facility], patient accepted to telemetry under dr saba. ck: discussed case with [facility], patient accepted to telemetry under dr saba. The patient wishes to leave against medical advice.  I have discussed the risks, benefits and alternatives (including the possibility of worsening of disease, pain, permanent disability, and/or death) with the patient.  The patient voices understanding of these risks, benefits, and alternatives and still wishes to sign out against medical advice.  The patient is awake, alert, oriented  x 3 and has demonstrated capacity to refuse/direct care.  I have advised the patient that they can and should return immediately should they develop any worse/different/additional symptoms, or if they change their mind and want to continue their care.

## 2023-12-07 NOTE — ED PROVIDER NOTE - NSICDXPASTSURGICALHX_GEN_ALL_CORE_FT
PAST SURGICAL HISTORY:  H/O wisdom tooth extraction     S/P bilateral inguinal hernia repair     S/P laser trabeculoplasty of eye left x 2

## 2023-12-07 NOTE — ED PROVIDER NOTE - PATIENT PORTAL LINK FT
You can access the FollowMyHealth Patient Portal offered by [facility] by registering at the following website: [email or web address]. By joining [employer]’s FollowMyHealth portal, you will also be able to view your health information using other applications (apps) compatible with our system. You can access the FollowMyHealth Patient Portal offered by [facility] by registering at the following website: [email or web address]. By joining [employer]’s FollowMyHealth portal, you will also be able to view your health information using other applications (apps) compatible with our system.

## 2023-12-07 NOTE — ED PROVIDER NOTE - OBJECTIVE STATEMENT
73 year old male, past medical history hdl, bph, thrombocytopenia, who presents with L sided rib pain. patient reports frequent episodes of using bathroom during the night, patient woke up to use the bathroom felt lightheaded @ that time, patient was then standing and urinating followed by syncope. patient states he hit L side of ribs on the bathtub, denies hitting head or ac use. patient reports persistent L sided rib pain prompting eval @ urgent care, as per CXR 7th/8th rib fx and sent to ed for eval. denies headache, fever, chills, chest pain, shortness of breath, skin changes, abd pain, nausea/vomiting, urinary symptoms, rash. patient lives @ home alone.

## 2023-12-07 NOTE — ED PROVIDER NOTE - NSFOLLOWUPINSTRUCTIONS_ED_ALL_ED_FT
Syncope, Adult  Outline of the head showing blood vessels that supply the brain.  Syncope refers to a condition in which a person temporarily loses consciousness. Syncope may also be called fainting or passing out. It is caused by a sudden decrease in blood flow to the brain. This can happen for a variety of reasons.    Most causes of syncope are not dangerous. It can be triggered by things such as needle sticks, seeing blood, pain, or intense emotion. However, syncope can also be a sign of a serious medical problem, such as a heart abnormality. Other causes can include dehydration, migraines, or taking medicines that lower blood pressure. Your health care provider may do tests to find the reason why you are having syncope.    If you faint, get medical help right away. Call your local emergency services (911 in the [place]    Follow these instructions at home:  Pay attention to any changes in your symptoms. Take these actions to stay safe and to help relieve your symptoms:    Knowing when you may be about to faint    Signs that you may be about to faint include:  Feeling dizzy, weak, light-headed, or like the room is spinning.  Feeling nauseous.  Seeing spots or seeing all white or all black in your field of vision.  Having cold, clammy skin or feeling warm and sweaty.  Hearing ringing in the ears (tinnitus).  If you start to feel like you might faint, sit or lie down right away. If sitting, put your head down between your legs. If lying down, raise (elevate) your feet above the level of your heart.  Breathe deeply and steadily. Wait until all the symptoms have passed.  Have someone stay with you until you feel stable.  Medicines    Take over-the-counter and prescription medicines only as told by your health care provider.  If you are taking blood pressure or heart medicine, get up slowly and take several minutes to sit and then stand. This can reduce dizziness and decrease the risk of syncope.  Lifestyle    Do not drive, use machinery, or play sports until your health care provider says it is okay.  Do not drink alcohol.  Do not use any products that contain nicotine or tobacco. These products include cigarettes, chewing tobacco, and vaping devices, such as e-cigarettes. If you need help quitting, ask your health care provider.  Avoid hot tubs and saunas.  General instructions    Talk with your health care provider about your symptoms. You may need to have testing to understand the cause of your syncope.  Drink enough fluid to keep your urine pale yellow.  Avoid prolonged standing. If you must stand for a long time, do movements such as:  Moving your legs.  Crossing your legs.  Flexing and stretching your leg muscles.  Squatting.  Keep all follow-up visits. This is important.  Contact a health care provider if:  You have episodes of near fainting.  Get help right away if:  You faint.  You hit your head or are injured after fainting.  You have any of these symptoms that may indicate trouble with your heart:  Fast or irregular heartbeats (palpitations).  Unusual pain in your chest, abdomen, or back.  Shortness of breath.  You have a seizure.  You have a severe headache.  You are confused.  You have vision problems.  You have severe weakness or trouble walking.  You are bleeding from your mouth or rectum, or you have black or tarry stool.  These symptoms may represent a serious problem that is an emergency. Do not wait to see if your symptoms will go away. Get medical help right away. Call your local emergency services (911 in the [place] Do not drive yourself to the hospital.    Summary  Syncope refers to a condition in which a person temporarily loses consciousness. Syncope may also be called fainting or passing out. It is caused by a sudden decrease in blood flow to the brain.  Signs that you may be about to faint include dizziness, feeling light-headed, feeling nauseous, sudden vision changes, or cold, clammy skin.  Even though most causes of syncope are not dangerous, syncope can be a sign of a serious medical problem. Get help right away if you faint.  If you start to feel like you might faint, sit or lie down right away. If sitting, put your head down between your legs. If lying down, raise (elevate) your feet above the level of your heart.  This information is not intended to replace advice given to you by your health care provider. Make sure you discuss any questions you have with your health care provider.    Document Revised: 04/28/2022 Document Reviewed: 04/28/2022  Elsevier Patient Education © 2023 [employer]    Rib Fracture    A rib fracture is a break or crack in one of the bones of the ribs. The ribs are long, curved bones that wrap around your chest and attach to your spine and your breastbone. The ribs protect your heart, lungs, and other organs in the chest.    A broken or cracked rib is often painful but is not usually serious. Most rib fractures heal on their own over time. However, rib fractures can be more serious if multiple ribs are broken or if broken ribs move out of place and push against other structures or organs.    What are the causes?  This condition is caused by:  Repetitive movements with high force, such as pitching a baseball or having very bad coughing spells.  A direct hit the chest, such as a sports injury, a car crash, or a fall.  Cancer that has spread to the bones, which can weaken bones and cause them to break.  What are the signs or symptoms?  Symptoms of this condition include:  Pain when you breathe in or cough.  Pain when someone presses on the injured area.  Feeling short of breath.  How is this diagnosed?  This condition is diagnosed with a physical exam and medical history. You may also have imaging tests, such as:  Chest X-ray.  CT scan.  MRI.  Bone scan.  Chest ultrasound.  How is this treated?  Treatment for this condition depends on the severity of the fracture. Most rib fractures usually heal on their own in 1–3 months. Healing may take longer if you have a cough or if you do activities that make the injury worse. While you heal, you may be given medicines to control the pain. You will also be taught deep breathing exercises.    Severe injuries may require hospitalization or surgery.    Follow these instructions at home:  Managing pain, stiffness, and swelling    If directed, put ice on the injured area. To do this:  Put ice in a plastic bag.  Place a towel between your skin and the bag.  Leave the ice on for 20 minutes, 2–3 times a day.  Remove the ice if your skin turns bright red. This is very important. If you cannot feel pain, heat, or cold, you have a greater risk of damage to the area.  Take over-the-counter and prescription medicines only as told by your health care provider.  Activity    Avoid doing activities or movements that cause pain. Be careful during activities and avoid bumping the injured rib.  Slowly increase your activity as told by your health care provider.  General instructions    Do deep breathing exercises as told by your health care provider. This helps prevent pneumonia, which is a common complication of a broken rib. Your health care provider may instruct you to:  Take deep breaths several times a day.  Try to cough several times a day, holding a pillow against the injured area.  Use a device called incentive spirometer to practice deep breathing several times a day.  Drink enough fluid to keep your urine pale yellow.  Do not wear a rib belt or binder. These restrict breathing, which can lead to pneumonia.  Keep all follow-up visits. This is important.  Contact a health care provider if:  You have a fever.  Get help right away if:  You have difficulty breathing or you are short of breath.  You develop a cough that does not stop, or you cough up thick or bloody sputum.  You have nausea, vomiting, or pain in your abdomen.  Your pain gets worse and medicine does not help.  These symptoms may represent a serious problem that is an emergency. Do not wait to see if the symptoms will go away. Get medical help right away. Call your local emergency services (911 in the [place] Do not drive yourself to the hospital.    Summary  A rib fracture is a break or crack in one of the bones of the ribs.  A broken or cracked rib is often painful but is not usually serious.  Most rib fractures heal on their own over time.  Treatment for this condition depends on the severity of the fracture.  Avoid doing any activities or movements that cause pain.  This information is not intended to replace advice given to you by your health care provider. Make sure you discuss any questions you have with your health care provider.    Document Revised: 04/09/2021 Document Reviewed: 04/09/2021  Elsevier Patient Education © 2023 [employer] Syncope, Adult  Outline of the head showing blood vessels that supply the brain.  Syncope refers to a condition in which a person temporarily loses consciousness. Syncope may also be called fainting or passing out. It is caused by a sudden decrease in blood flow to the brain. This can happen for a variety of reasons.    Most causes of syncope are not dangerous. It can be triggered by things such as needle sticks, seeing blood, pain, or intense emotion. However, syncope can also be a sign of a serious medical problem, such as a heart abnormality. Other causes can include dehydration, migraines, or taking medicines that lower blood pressure. Your health care provider may do tests to find the reason why you are having syncope.    If you faint, get medical help right away. Call your local emergency services (911 in the [place]    Follow these instructions at home:  Pay attention to any changes in your symptoms. Take these actions to stay safe and to help relieve your symptoms:    Knowing when you may be about to faint    Signs that you may be about to faint include:  Feeling dizzy, weak, light-headed, or like the room is spinning.  Feeling nauseous.  Seeing spots or seeing all white or all black in your field of vision.  Having cold, clammy skin or feeling warm and sweaty.  Hearing ringing in the ears (tinnitus).  If you start to feel like you might faint, sit or lie down right away. If sitting, put your head down between your legs. If lying down, raise (elevate) your feet above the level of your heart.  Breathe deeply and steadily. Wait until all the symptoms have passed.  Have someone stay with you until you feel stable.  Medicines    Take over-the-counter and prescription medicines only as told by your health care provider.  If you are taking blood pressure or heart medicine, get up slowly and take several minutes to sit and then stand. This can reduce dizziness and decrease the risk of syncope.  Lifestyle    Do not drive, use machinery, or play sports until your health care provider says it is okay.  Do not drink alcohol.  Do not use any products that contain nicotine or tobacco. These products include cigarettes, chewing tobacco, and vaping devices, such as e-cigarettes. If you need help quitting, ask your health care provider.  Avoid hot tubs and saunas.  General instructions    Talk with your health care provider about your symptoms. You may need to have testing to understand the cause of your syncope.  Drink enough fluid to keep your urine pale yellow.  Avoid prolonged standing. If you must stand for a long time, do movements such as:  Moving your legs.  Crossing your legs.  Flexing and stretching your leg muscles.  Squatting.  Keep all follow-up visits. This is important.  Contact a health care provider if:  You have episodes of near fainting.  Get help right away if:  You faint.  You hit your head or are injured after fainting.  You have any of these symptoms that may indicate trouble with your heart:  Fast or irregular heartbeats (palpitations).  Unusual pain in your chest, abdomen, or back.  Shortness of breath.  You have a seizure.  You have a severe headache.  You are confused.  You have vision problems.  You have severe weakness or trouble walking.  You are bleeding from your mouth or rectum, or you have black or tarry stool.  These symptoms may represent a serious problem that is an emergency. Do not wait to see if your symptoms will go away. Get medical help right away. Call your local emergency services (911 in the [place] Do not drive yourself to the hospital.    Summary  Syncope refers to a condition in which a person temporarily loses consciousness. Syncope may also be called fainting or passing out. It is caused by a sudden decrease in blood flow to the brain.  Signs that you may be about to faint include dizziness, feeling light-headed, feeling nauseous, sudden vision changes, or cold, clammy skin.  Even though most causes of syncope are not dangerous, syncope can be a sign of a serious medical problem. Get help right away if you faint.  If you start to feel like you might faint, sit or lie down right away. If sitting, put your head down between your legs. If lying down, raise (elevate) your feet above the level of your heart.  This information is not intended to replace advice given to you by your health care provider. Make sure you discuss any questions you have with your health care provider.    Document Revised: 04/28/2022 Document Reviewed: 04/28/2022  Elsevier Patient Education © 2023 [employer]    Rib Fracture    A rib fracture is a break or crack in one of the bones of the ribs. The ribs are long, curved bones that wrap around your chest and attach to your spine and your breastbone. The ribs protect your heart, lungs, and other organs in the chest.    A broken or cracked rib is often painful but is not usually serious. Most rib fractures heal on their own over time. However, rib fractures can be more serious if multiple ribs are broken or if broken ribs move out of place and push against other structures or organs.    What are the causes?  This condition is caused by:  Repetitive movements with high force, such as pitching a baseball or having very bad coughing spells.  A direct hit the chest, such as a sports injury, a car crash, or a fall.  Cancer that has spread to the bones, which can weaken bones and cause them to break.  What are the signs or symptoms?  Symptoms of this condition include:  Pain when you breathe in or cough.  Pain when someone presses on the injured area.  Feeling short of breath.  How is this diagnosed?  This condition is diagnosed with a physical exam and medical history. You may also have imaging tests, such as:  Chest X-ray.  CT scan.  MRI.  Bone scan.  Chest ultrasound.  How is this treated?  Treatment for this condition depends on the severity of the fracture. Most rib fractures usually heal on their own in 1–3 months. Healing may take longer if you have a cough or if you do activities that make the injury worse. While you heal, you may be given medicines to control the pain. You will also be taught deep breathing exercises.    Severe injuries may require hospitalization or surgery.    Follow these instructions at home:  Managing pain, stiffness, and swelling    If directed, put ice on the injured area. To do this:  Put ice in a plastic bag.  Place a towel between your skin and the bag.  Leave the ice on for 20 minutes, 2–3 times a day.  Remove the ice if your skin turns bright red. This is very important. If you cannot feel pain, heat, or cold, you have a greater risk of damage to the area.  Take over-the-counter and prescription medicines only as told by your health care provider.  Activity    Avoid doing activities or movements that cause pain. Be careful during activities and avoid bumping the injured rib.  Slowly increase your activity as told by your health care provider.  General instructions    Do deep breathing exercises as told by your health care provider. This helps prevent pneumonia, which is a common complication of a broken rib. Your health care provider may instruct you to:  Take deep breaths several times a day.  Try to cough several times a day, holding a pillow against the injured area.  Use a device called incentive spirometer to practice deep breathing several times a day.  Drink enough fluid to keep your urine pale yellow.  Do not wear a rib belt or binder. These restrict breathing, which can lead to pneumonia.  Keep all follow-up visits. This is important.  Contact a health care provider if:  You have a fever.  Get help right away if:  You have difficulty breathing or you are short of breath.  You develop a cough that does not stop, or you cough up thick or bloody sputum.  You have nausea, vomiting, or pain in your abdomen.  Your pain gets worse and medicine does not help.  These symptoms may represent a serious problem that is an emergency. Do not wait to see if the symptoms will go away. Get medical help right away. Call your local emergency services (911 in the [place] Do not drive yourself to the hospital.    Summary  A rib fracture is a break or crack in one of the bones of the ribs.  A broken or cracked rib is often painful but is not usually serious.  Most rib fractures heal on their own over time.  Treatment for this condition depends on the severity of the fracture.  Avoid doing any activities or movements that cause pain.  This information is not intended to replace advice given to you by your health care provider. Make sure you discuss any questions you have with your health care provider.    Document Revised: 04/09/2021 Document Reviewed: 04/09/2021  Elsevier Patient Education © 2023 [employer] Inc[employer]

## 2023-12-07 NOTE — ED PROVIDER NOTE - PHYSICAL EXAMINATION
CONSTITUTIONAL: elderly appearing male, no acute distress  SKIN: skin exam is warm and dry  HEAD: Normocephalic; atraumatic  EYES: PERRL, EOM intact; conjunctiva and sclera clear.  ENT: MMM  NECK: ROM intact.  CARD: S1, S2 normal, no murmur  RESP: No wheezes, rales or rhonchi. Good air movement bilaterally  ABD: soft; non-distended; non-tender  EXT: +L sided lower posterior rib tenderness, no skin tenting or skin changes. pelvis stable. no anterior chest wall tenderness.   NEURO: awake, alert, following commands, oriented, grossly unremarkable. No Focal deficits. GCS 15.   PSYCH: Cooperative, appropriate.

## 2023-12-07 NOTE — ED ADULT NURSE NOTE - NSFALLRISKINTERV_ED_ALL_ED
Communicate fall risk and risk factors to all staff, patient, and family/Provide visual cue: yellow wristband, yellow gown, etc/Reinforce activity limits and safety measures with patient and family/Call bell, personal items and telephone in reach/Instruct patient to call for assistance before getting out of bed/chair/stretcher/Non-slip footwear applied when patient is off stretcher/[place] to call system/Physically safe environment - no spills, clutter or unnecessary equipment/Purposeful Proactive Rounding/Room/bathroom lighting operational, light cord in reach Communicate fall risk and risk factors to all staff, patient, and family/Provide visual cue: yellow wristband, yellow gown, etc/Reinforce activity limits and safety measures with patient and family/Call bell, personal items and telephone in reach/Instruct patient to call for assistance before getting out of bed/chair/stretcher/Non-slip footwear applied when patient is off stretcher/[place] to call system/Physically safe environment - no spills, clutter or unnecessary equipment/Purposeful Proactive Rounding/Room/bathroom lighting operational, light cord in reach

## 2023-12-07 NOTE — ED PROVIDER NOTE - CLINICAL SUMMARY MEDICAL DECISION MAKING FREE TEXT BOX
73 year old male, past medical history hdl, bph, thrombocytopenia who presents s/p syncope. patient woke up at 4am to use the bathroom, while he was standing urinating he synopsized waking up on the ground with L sided rib pain. denies prodromal symptoms. patient presented to urgent care due to persistent L sided rib pain found to have 2 rib fx sent to ed for further eval. +TTP overlying L lower ribs without skin tenting/changes. micturition syncope vs cardiac vs arrhythmia. will obtain scans, blood work, ekg.

## 2023-12-08 RX ORDER — OXYCODONE AND ACETAMINOPHEN 5; 325 MG/1; MG/1
1 TABLET ORAL
Qty: 15 | Refills: 0
Start: 2023-12-08 | End: 2023-12-12

## 2023-12-08 NOTE — ED ADULT NURSE REASSESSMENT NOTE - NS ED NURSE REASSESS COMMENT FT1
After multiple discussions with provider, pt decided to AMA  paperwork signed, discussed risks of leaving   pt verbalized understanding  PIV removed

## 2023-12-08 NOTE — ED ADULT NURSE REASSESSMENT NOTE - NS ED NURSE REASSESS COMMENT FT1
Patient reevaluated by MD; cleared for discharge. Patient alert verbal oriented x3; ambulatory w/ steady gait. Left ED safely without complaint. Discharge paperwork provided. Patient reevaluated by MD; cleared to leave s/p AMA form filled and signed. Patient alert verbal oriented x3; ambulatory w/ steady gait. Left ED safely without complaint. Discharge paperwork provided.

## 2023-12-19 ENCOUNTER — APPOINTMENT (OUTPATIENT)
Dept: OTOLARYNGOLOGY | Facility: CLINIC | Age: 73
End: 2023-12-19
Payer: MEDICARE

## 2023-12-19 VITALS
OXYGEN SATURATION: 99 % | HEART RATE: 69 BPM | BODY MASS INDEX: 21.34 KG/M2 | HEIGHT: 64 IN | WEIGHT: 125 LBS | SYSTOLIC BLOOD PRESSURE: 104 MMHG | TEMPERATURE: 98 F | DIASTOLIC BLOOD PRESSURE: 76 MMHG

## 2023-12-19 DIAGNOSIS — R09.82 POSTNASAL DRIP: ICD-10-CM

## 2023-12-19 DIAGNOSIS — R05.9 COUGH, UNSPECIFIED: ICD-10-CM

## 2023-12-19 PROCEDURE — 31231 NASAL ENDOSCOPY DX: CPT | Mod: 59

## 2023-12-19 PROCEDURE — 31575 DIAGNOSTIC LARYNGOSCOPY: CPT

## 2023-12-19 PROCEDURE — 99215 OFFICE O/P EST HI 40 MIN: CPT | Mod: 25

## 2023-12-19 RX ORDER — BENZONATATE 100 MG/1
100 CAPSULE ORAL
Qty: 30 | Refills: 0 | Status: ACTIVE | COMMUNITY
Start: 2023-12-19 | End: 1900-01-01

## 2023-12-19 RX ORDER — AMOXICILLIN AND CLAVULANATE POTASSIUM 875; 125 MG/1; MG/1
875-125 TABLET, COATED ORAL
Qty: 20 | Refills: 0 | Status: ACTIVE | COMMUNITY
Start: 2023-12-19 | End: 1900-01-01

## 2023-12-19 RX ORDER — METHYLPREDNISOLONE 4 MG/1
4 TABLET ORAL
Qty: 1 | Refills: 0 | Status: ACTIVE | COMMUNITY
Start: 2023-12-19 | End: 1900-01-01

## 2023-12-19 NOTE — ASSESSMENT
[FreeTextEntry1] : 72 year old male presents with concern for sinusitis recently treated with 2 courses of antibiotics. On nasal endoscopy, there is evidence of turbinate hypertrophy and nasal septal deviation. Based on his history and exam findings, I am recommending continuing nasal saline rinses. We will avoid nasal steroids due to history of glaucoma, instead I recommend Azelastine nasal spray. I am also recommending CT Sinus. Follow up after to review results and discuss next steps.  12/19/23: Patient presents for repeat evaluation. He lost consciousness while urinating on 12/7 and went to urgent care. He was incidentally found to have COVID and his last positive test was on 12/11. He has nasal congestion and is concerned he may have sinusitis.  He has some rhinorrhea.  He is doing saline rinses and Azelastine. He also has productive cough for the past week and is expectorating phlegm. On physical exam/ nasal endoscopy he was found to have left septal deviation, bilateral turbinate hypertrophy and evidence of sinusitis. Laryngoscopy was normal.  We also reviewed his CT head from 12/7 which showed right maxillary sinusitis. We recommended Augmentin. We explicitly asked if he had any allergies to Penicillin's and he said no. We also recommended Medrol dose pack and Benzonatate for cough. Continue nasal saline irrigations and Azelastine. Follow up in 2-3 weeks.  - Augmentin, Medrol dose pack, Benzonatate - Continue nasal saline rinses and Azelastine - Followup in 2-3 weeks

## 2023-12-19 NOTE — PROCEDURE
[FreeTextEntry6] : - Nasal Endoscopy Procedure Note  Pre-operative Diagnosis: post nasal drip  Post-operative Diagnosis: left nasal septal deviation and turbinate hypertrophy, evidence of purulence in right nasal cavity c/w sinusitis  Anesthesia: Topical Procedure: Bilateral nasal endoscopy   Procedure Details:  After topical anesthesia and decongestant, the patient was placed in the supine position. The telescope was passed along the left nasal floor to the nasopharynx. It was then passed into the region of the middle meatus, middle turbinate, and the sphenoethmoid region. An identical procedure was performed on the right side.    Findings:  Mucosa: 	 normal	 Nasal septum: deviated to left Discharge: 	 evidence of purulence in right nasal cavity c/w sinusitis  Turbinates: 	hypertrophied  Adenoid: 	 normal	 Posterior choanae: 	normal	 Eustachian tubes: 	normal	 Mucous stranding: 	normal 	 Lesions: 	 Not present	   Comments:  Condition: Stable. Patient tolerated procedure well.  [de-identified] : Laryngoscopy Flexible Laryngoscopy - Procedure Note   Pre-operative Diagnosis: Cough Post-operative Diagnosis: Same normal exam Anesthesia: Topical - 1 % Lidocaine/Phenylephrine Procedure: Flexible Laryngoscopy   Procedure Details: The patient was placed in the sitting position. After decongestant and anesthesia were applied the laryngoscope was passed. The nasal cavities, nasopharynx, oropharynx, hypopharynx, and larynx were all examined. Vocal folds were examined during respiration and phonation. The following findings were noted:   Findings: Nose: Septum is midline, turbinates are normal, nasal airways patent, mucosa normal Nasopharynx: Adenoids normal, no masses, eustachian tube normal Oropharynx: Pharyngeal walls symmetric and without lesion. Tonsils/fossae symmetric and normal. Hypopharynx: Hypopharynx and pyriform sinuses without lesion. No masses or asymmetry. No pooling of secretions. Larynx: Epiglottis and aryepiglottic folds were sharp and crisp bilaterally. Bilateral false and true vocal folds normal appearance. Bilateral vocal folds fully mobile and symmetric. Airway was widely patent.   Condition: Stable. Patient tolerated procedure well.   Complications: None

## 2023-12-19 NOTE — HISTORY OF PRESENT ILLNESS
[de-identified] : 6/19/23 72M presents with concern for sinusitis. He is coughing up pus and has post nasal drip. Symptoms started several years ago. Denies nasal congestion, facial pressure, changes in sense of taste or smell or dental pain.   He's seen 2 ENT's in the past. 2-3 months ago, was treated with Doxycycline and Augmentin for a sinus infection. He's been using a neti pot and saline spray. He also had an MRI Sinus with evidence of inflammatory mucosal thickening seen in almost all paranasal sinuses, particular opacification of right maxilary sinus.  - [FreeTextEntry1] : 12/19/23: Patient presents for repeat evaluation. He lost consciousness while urinating on 12/7 and went to urgent care. He was incidentally found to have COVID and his last positive test was on 12/11. He has nasal congestion and is concerned he may have sinusitis.  He has some rhinorrhea. He denies facial pain, or changes in smell or taste. He is doing saline rinses and Azelastine. He also has productive cough for the past week and is expectorating phlegm. No issues eating, chewing, or swallowing. He denies any changes in his voice.

## 2023-12-19 NOTE — PHYSICAL EXAM
[] : septum deviated to the left [Midline] : trachea located in midline position [Normal] : no rashes [de-identified] : hypertrophied

## 2023-12-21 ENCOUNTER — APPOINTMENT (OUTPATIENT)
Dept: PAIN MANAGEMENT | Facility: CLINIC | Age: 73
End: 2023-12-21

## 2023-12-26 RX ORDER — DOXYCYCLINE HYCLATE 100 MG/1
100 CAPSULE ORAL TWICE DAILY
Qty: 20 | Refills: 0 | Status: ACTIVE | COMMUNITY
Start: 2023-12-26 | End: 1900-01-01

## 2023-12-29 ENCOUNTER — APPOINTMENT (OUTPATIENT)
Dept: PAIN MANAGEMENT | Facility: CLINIC | Age: 73
End: 2023-12-29
Payer: MEDICARE

## 2023-12-29 VITALS — HEART RATE: 88 BPM | SYSTOLIC BLOOD PRESSURE: 120 MMHG | DIASTOLIC BLOOD PRESSURE: 82 MMHG | OXYGEN SATURATION: 99 %

## 2023-12-29 PROCEDURE — 99203 OFFICE O/P NEW LOW 30 MIN: CPT

## 2023-12-29 RX ORDER — TIZANIDINE HYDROCHLORIDE 2 MG/1
2 CAPSULE ORAL TWICE DAILY
Qty: 60 | Refills: 0 | Status: ACTIVE | COMMUNITY
Start: 2023-12-29 | End: 1900-01-01

## 2023-12-29 NOTE — PHYSICAL EXAM
[Normal] : Regular, no tachycardia [Normal muscle bulk without asymmetry] : normal muscle bulk without asymmetry [Within functional limits and without pain] : within functional limits and without pain [Spinous Process Tenderness] : no spinous process tenderness [Facet Tenderness] : no facet tenderness [Paraspinal Tenderness] : no paraspinal tenderness [Sacroiliac tenderness] : no sacroiliac tenderness [GreaterTrochanteric bursa tenderness] : no greater trochanteric bursa tenderness [de-identified] : tenderness to palpation over 8th and 9th rib on left, mid-back

## 2023-12-29 NOTE — HISTORY OF PRESENT ILLNESS
[Back Pain] : back pain [___ days] : [unfilled] day(s) ago [Constant] : constant [5] : an average pain level of 5/10 [4] : a minimum pain level of 4/10 [10] : a maximum pain level of 10/10 [Medications] : medications [FreeTextEntry1] : Jorge Garcia presents for initial evaluation accompanied by friend.   Patient has PMH of IBS, HLD, glaucoma, and MARCIA  Patient complains of left-sided mid back pain that started December 7, after a ground level fall at home. He characterizes the pain as pulling sensation as well as tightness. Patient went to [facility] ED where CT chest revealed displaced left 9th and nondisplaced left 8th rib fractures. Patient also went to the [facility] ED for a second opinion and had another CT scan (no access to images).  Patient was seen by  Dr. Andrew Deutsch (pain management). He received trigger point injections on 12/18 which provided minimal relief. He also received left-sided T8-T9 intercostal nerve block with 40-50% symptom reduction. His pain is worse with movement. Patient says he has avoided heavy lifting due to concerns for worsening pain. He complains of difficulty sleeping secondary to symptoms.  Patient has not trialed physical therapy., heat or cold application.  He takes medical marijuana twice a day which helps with both pain and anxiety. Tylenol has not helped.  [FreeTextEntry7] : mid back, left side [de-identified] : tightness [FreeTextEntry3] : movement

## 2023-12-29 NOTE — ASSESSMENT
[FreeTextEntry1] : 73 year old male with history of MARCIA, HLD and IBS presents after GLF with fracture to left 8th and 9th ribs with subsequent pain to midback that improved after intercostal nerve block at other clinic. Patient continues to have moderate pain and wishes to proceed with another intercostal nerve block.  The potential benefits as well as rare but possible risks were reviewed with the patient.  These risks including infection including epidural abscess, meningitis, osteomyelitis, and discitis, bleeding including epidural hematoma, nerve injury, paralysis, failure to relieve pain or worse pain, headache, pneumothorax, elevated blood sugars, allergic reactions, adverse reactions to medications, vasovagal reactions, falls, etc. Following that discussion, we decided together that the potential benefits outweigh the potential risks and we decided to proceed with scheduling the procedure.  I answered all the patient's questions about the procedure including the technical details of the procedure and also offered that if any other questions arise we will also be happy to answer those on the day of the procedure. All questions today were answered to the patients satisfaction, and the patient stated their verbal understanding.    Plan: - sent prescription for tizanidine 2mg BID prn for muscle spasms - instructed patient to apply lidocaine patch to back - recommend that patient contact PCP to order DEXA scan to evaluate bone density. Depending on the finding of the scan, patient may need to see rheumatologist/endocrinologist or orthopedic surgeon for further evaluation. - submit authorization for left intercostal nerve block at T8-T9

## 2023-12-29 NOTE — REVIEW OF SYSTEMS
[Back Pain] : back pain [Muscle Pain] : muscle pain [Negative] : Constitutional [Radiating Pain] : no radiating pain [Weakness] : no weakness [Numbness] : no numbness

## 2024-01-02 ENCOUNTER — APPOINTMENT (OUTPATIENT)
Dept: OTOLARYNGOLOGY | Facility: CLINIC | Age: 74
End: 2024-01-02

## 2024-01-09 ENCOUNTER — APPOINTMENT (OUTPATIENT)
Dept: OTOLARYNGOLOGY | Facility: CLINIC | Age: 74
End: 2024-01-09
Payer: MEDICARE

## 2024-01-09 DIAGNOSIS — J34.2 DEVIATED NASAL SEPTUM: ICD-10-CM

## 2024-01-09 DIAGNOSIS — J32.9 CHRONIC SINUSITIS, UNSPECIFIED: ICD-10-CM

## 2024-01-09 DIAGNOSIS — J30.0 VASOMOTOR RHINITIS: ICD-10-CM

## 2024-01-09 PROCEDURE — 99215 OFFICE O/P EST HI 40 MIN: CPT | Mod: 25

## 2024-01-09 PROCEDURE — 31231 NASAL ENDOSCOPY DX: CPT

## 2024-01-11 PROBLEM — J34.2 DEVIATED NASAL SEPTUM: Status: ACTIVE | Noted: 2023-06-19

## 2024-01-11 PROBLEM — J32.9 CHRONIC SINUSITIS: Status: ACTIVE | Noted: 2023-06-19

## 2024-01-11 NOTE — PHYSICAL EXAM
[] : septum deviated to the left [Midline] : trachea located in midline position [Normal] : no rashes [de-identified] : hypertrophied

## 2024-01-11 NOTE — PROCEDURE
[de-identified] : Laryngoscopy Flexible Laryngoscopy - Procedure Note   Pre-operative Diagnosis: Cough Post-operative Diagnosis: Same normal exam Anesthesia: Topical - 1 % Lidocaine/Phenylephrine Procedure: Flexible Laryngoscopy   Procedure Details: The patient was placed in the sitting position. After decongestant and anesthesia were applied the laryngoscope was passed. The nasal cavities, nasopharynx, oropharynx, hypopharynx, and larynx were all examined. Vocal folds were examined during respiration and phonation. The following findings were noted:   Findings: Nose: Septum is midline, turbinates are normal, nasal airways patent, mucosa normal Nasopharynx: Adenoids normal, no masses, eustachian tube normal Oropharynx: Pharyngeal walls symmetric and without lesion. Tonsils/fossae symmetric and normal. Hypopharynx: Hypopharynx and pyriform sinuses without lesion. No masses or asymmetry. No pooling of secretions. Larynx: Epiglottis and aryepiglottic folds were sharp and crisp bilaterally. Bilateral false and true vocal folds normal appearance. Bilateral vocal folds fully mobile and symmetric. Airway was widely patent.   Condition: Stable. Patient tolerated procedure well.   Complications: None [FreeTextEntry6] : - Nasal Endoscopy Procedure Note  Pre-operative Diagnosis: post nasal drip  Post-operative Diagnosis: left nasal septal deviation and turbinate hypertrophy, no purulent or evidence of drainage today. Anesthesia: Topical Procedure: Bilateral nasal endoscopy   Procedure Details:  After topical anesthesia and decongestant, the patient was placed in the supine position. The telescope was passed along the left nasal floor to the nasopharynx. It was then passed into the region of the middle meatus, middle turbinate, and the sphenoethmoid region. An identical procedure was performed on the right side.    Findings:  Mucosa: 	 normal	 Nasal septum: deviated to left Discharge: 	No evidence of purulence today Turbinates: 	hypertrophied  Adenoid: 	 normal	 Posterior choanae: 	normal	 Eustachian tubes: 	normal	 Mucous stranding: 	normal 	 Lesions: 	 Not present	   Comments:  Condition: Stable. Patient tolerated procedure well.

## 2024-01-11 NOTE — ASSESSMENT
[FreeTextEntry1] : 72 year old male presents with concern for sinusitis recently treated with 2 courses of antibiotics. On nasal endoscopy, there is evidence of turbinate hypertrophy and nasal septal deviation. Based on his history and exam findings, I am recommending continuing nasal saline rinses. We will avoid nasal steroids due to history of glaucoma, instead I recommend Azelastine nasal spray. I am also recommending CT Sinus. Follow up after to review results and discuss next steps.  12/19/23: Patient presents for repeat evaluation. He lost consciousness while urinating on 12/7 and went to urgent care. He was incidentally found to have COVID and his last positive test was on 12/11. He has nasal congestion and is concerned he may have sinusitis.  He has some rhinorrhea.  He is doing saline rinses and Azelastine. He also has productive cough for the past week and is expectorating phlegm. On physical exam/ nasal endoscopy he was found to have left septal deviation, bilateral turbinate hypertrophy and evidence of sinusitis. Laryngoscopy was normal.  We also reviewed his CT head from 12/7 which showed right maxillary sinusitis. We recommended Augmentin. We explicitly asked if he had any allergies to Penicillin's and he said no. We also recommended Medrol dose pack and Benzonatate for cough. Continue nasal saline irrigations and Azelastine. Follow up in 2-3 weeks.  1/9/24: Patient completed above treatment including antibiotics, steroids nasal saline and nasal antihistamine.  Notes significant improvement in terms of symptomatology.  Exam significantly improved as well.  While there is some mild inflammation no evidence of overt purulence or active sinusitis.  At this time I am recommending continue nasal saline and nasal antihistamine.  Follow-up if symptoms persist.  Should that be the case would recommend formal CT sinus for further evaluation and potentially discuss surgical intervention.   - Continue nasal saline rinses and Azelastine -Follow-up if symptoms recur or worsen, at which point I would recommend imaging including CT sinus - Followup in 2-3 weeks

## 2024-01-11 NOTE — HISTORY OF PRESENT ILLNESS
[de-identified] : 6/19/23 72M presents with concern for sinusitis. He is coughing up pus and has post nasal drip. Symptoms started several years ago. Denies nasal congestion, facial pressure, changes in sense of taste or smell or dental pain.   He's seen 2 ENT's in the past. 2-3 months ago, was treated with Doxycycline and Augmentin for a sinus infection. He's been using a neti pot and saline spray. He also had an MRI Sinus with evidence of inflammatory mucosal thickening seen in almost all paranasal sinuses, particular opacification of right maxilary sinus.  - 12/19/23: Patient presents for repeat evaluation. He lost consciousness while urinating on 12/7 and went to urgent care. He was incidentally found to have COVID and his last positive test was on 12/11. He has nasal congestion and is concerned he may have sinusitis.  He has some rhinorrhea. He denies facial pain, or changes in smell or taste. He is doing saline rinses and Azelastine. He also has productive cough for the past week and is expectorating phlegm. No issues eating, chewing, or swallowing. He denies any changes in his voice.  - [FreeTextEntry1] : 1/9/24: Patient presents for repeat evaluation. He took Augmentin and Medrol dose pack.  Notes significant improvement in terms of symptoms.  Feels that cough is improved.  Feels that nasal symptoms have also improved essentially back to baseline.  Still with some very mild irritation in the throat.  No new ear, nose, throat symptoms otherwise.

## 2024-01-12 ENCOUNTER — APPOINTMENT (OUTPATIENT)
Dept: PAIN MANAGEMENT | Facility: CLINIC | Age: 74
End: 2024-01-12
Payer: MEDICARE

## 2024-01-12 VITALS — HEART RATE: 90 BPM | DIASTOLIC BLOOD PRESSURE: 79 MMHG | OXYGEN SATURATION: 97 % | SYSTOLIC BLOOD PRESSURE: 114 MMHG

## 2024-01-12 DIAGNOSIS — S22.42XS MULTIPLE FRACTURES OF RIBS, LEFT SIDE, SEQUELA: ICD-10-CM

## 2024-01-12 PROCEDURE — 64421 NJX AA&/STRD NTRCOST NRV EA: CPT | Mod: 50

## 2024-01-12 PROCEDURE — 64420 NJX AA&/STRD NTRCOST NRV 1: CPT

## 2024-01-12 NOTE — PROCEDURE
[FreeTextEntry1] : Left Intercostal nerve block, T7, T8, T9    The potential benefits as well as rare but possible risks were reviewed with the patient.  These risks including infection including epidural abscess, meningitis, osteomyelitis, and discitis, bleeding including epidural hematoma, nerve injury, paralysis, failure to relieve pain or worse pain, headache, pneumothorax, tension pneumothorax, elevated blood sugars, allergic reactions, adverse reactions to medications, vasovagal reactions, falls, etc. Following that discussion, all questions were again answered to the patient's satisfaction, the patient stated their verbal understanding and written consent was obtained.   After obtaining consent, pre-procedure blood pressure and pulse were recorded and are in the nursing record for review. The patient was placed in a prone position, blood pressure, pulse and oxygen saturation were measured throughout the procedure. The respective thoracic region was prepped with chloroprep and draped in sterile fashion.   A 25 gauge 3.5 inch, 25G 5 inch needle was inserted into the target thoracic level under fluoroscopic guidance. The needle was guided to toward the inferior border of the thoracic rib along the mid axillary line. No paresthesias were elicited with needle placement and aspiration was negative for blood, CSF and air. Next 3 ml of a Solution of 8 ml Ropivicaine 0.2% and 10 mg Dexamethasone was injected.   The identical procedure was performed at the remaining levels. The skin was cleansed, and a sterile bandage was applied. Following the procedure, the patient's vital signs were stable. The patient was monitored for 30 minutes following the procedure and blood pressure, pulse and oxygen saturation remained and baseline and consistent . The patient tolerated the procedure well and no complications were encountered. The patient was discharged home in good condition with post-procedural instructions.   Time Out: Immediately prior to the procedure, the following was verbally confirmed that there is a consent form and that the correct patient, planned procedure, site and side are consistent with documentation and that necessary equipment and/or blood products are available prior to the start of the case.

## 2024-01-26 ENCOUNTER — APPOINTMENT (OUTPATIENT)
Dept: PAIN MANAGEMENT | Facility: CLINIC | Age: 74
End: 2024-01-26
Payer: MEDICARE

## 2024-01-26 VITALS
WEIGHT: 125 LBS | HEIGHT: 64 IN | DIASTOLIC BLOOD PRESSURE: 73 MMHG | BODY MASS INDEX: 21.34 KG/M2 | HEART RATE: 93 BPM | OXYGEN SATURATION: 98 % | SYSTOLIC BLOOD PRESSURE: 112 MMHG

## 2024-01-26 DIAGNOSIS — M54.6 PAIN IN THORACIC SPINE: ICD-10-CM

## 2024-01-26 DIAGNOSIS — Z87.19 PERSONAL HISTORY OF OTHER DISEASES OF THE DIGESTIVE SYSTEM: ICD-10-CM

## 2024-01-26 DIAGNOSIS — Z86.39 PERSONAL HISTORY OF OTHER ENDOCRINE, NUTRITIONAL AND METABOLIC DISEASE: ICD-10-CM

## 2024-01-26 DIAGNOSIS — R07.81 PLEURODYNIA: ICD-10-CM

## 2024-01-26 PROCEDURE — 99213 OFFICE O/P EST LOW 20 MIN: CPT

## 2024-01-26 NOTE — ASSESSMENT
[FreeTextEntry1] : 73 year old male with history of MARCIA, HLD and IBS presents after GLF with fracture to left 8th and 9th ribs with subsequent pain to midback that improved after Left T8 and T9 intercostal nerve block done on 01/12/24. Patient reports improvement of left sided back pain, although he began to feel Right sided thoracic back pain on the lateral aspect of this thoracic spine. The pain is worse with activity and movement. Recommend Thoracic Medial Branch Block. Patient wishes to proceed.  The potential benefits as well as rare but possible risks were reviewed with the patient.  These risks including infection including epidural abscess, meningitis, osteomyelitis, and discitis, bleeding including epidural hematoma, nerve injury, paralysis, failure to relieve pain or worse pain, headache, pneumothorax, elevated blood sugars, allergic reactions, adverse reactions to medications, vasovagal reactions, falls, etc. Following that discussion, we decided together that the potential benefits outweigh the potential risks and we decided to proceed with scheduling the procedure.  I answered all the patient's questions about the procedure including the technical details of the procedure and also offered that if any other questions arise we will also be happy to answer those on the day of the procedure. All questions today were answered to the patients satisfaction, and the patient stated their verbal understanding.    Plan: - Thoracic Medial Branch Block today at T11-T12 and T12-L1  - Follow up with GI on 01/31/24 - Follow up in 2 weeks   ================================================== ****** NEEDS Thoracic medial branch block procedure note*******

## 2024-01-26 NOTE — REVIEW OF SYSTEMS
[Back Pain] : back pain [Negative] : Genitourinary [Numbness] : no numbness [FreeTextEntry7] : IBS flare up

## 2024-01-26 NOTE — PHYSICAL EXAM
[Normal muscle bulk without asymmetry] : normal muscle bulk without asymmetry [Normal Spine curvature] : normal spine curvature [Paraspinal Tenderness] : paraspinal tenderness [Normal] : Skin color, texture, turgor normal, no rashes or lesions in the four extremities and back [Miller's Test] : positive Miller's Test [Spinous Process Tenderness] : no spinous process tenderness [Ataxic] : not ataxic [Antalgic] : not antalgic [de-identified] : Paraspinal tenderness around T9-T10  [de-identified] : Limited range of motion on spinal flexion and extension

## 2024-02-15 ENCOUNTER — APPOINTMENT (OUTPATIENT)
Dept: PAIN MANAGEMENT | Facility: CLINIC | Age: 74
End: 2024-02-15
Payer: MEDICARE

## 2024-02-15 VITALS — HEART RATE: 65 BPM | SYSTOLIC BLOOD PRESSURE: 119 MMHG | OXYGEN SATURATION: 97 % | DIASTOLIC BLOOD PRESSURE: 77 MMHG

## 2024-02-15 VITALS — WEIGHT: 120 LBS | HEIGHT: 64 IN | BODY MASS INDEX: 20.49 KG/M2

## 2024-02-15 DIAGNOSIS — R07.81 PLEURODYNIA: ICD-10-CM

## 2024-02-15 DIAGNOSIS — B02.9 ZOSTER W/OUT COMPLICATIONS: ICD-10-CM

## 2024-02-15 PROCEDURE — 99215 OFFICE O/P EST HI 40 MIN: CPT

## 2024-02-16 ENCOUNTER — APPOINTMENT (OUTPATIENT)
Dept: PAIN MANAGEMENT | Facility: CLINIC | Age: 74
End: 2024-02-16

## 2024-02-20 ENCOUNTER — APPOINTMENT (OUTPATIENT)
Dept: OTOLARYNGOLOGY | Facility: CLINIC | Age: 74
End: 2024-02-20

## 2024-02-20 RX ORDER — TADALAFIL 5 MG/1
5 TABLET ORAL
Qty: 6 | Refills: 0 | Status: ACTIVE | COMMUNITY
Start: 2021-06-21 | End: 1900-01-01

## 2024-02-21 ENCOUNTER — APPOINTMENT (OUTPATIENT)
Dept: UROLOGY | Facility: CLINIC | Age: 74
End: 2024-02-21
Payer: MEDICARE

## 2024-02-21 DIAGNOSIS — N52.9 MALE ERECTILE DYSFUNCTION, UNSPECIFIED: ICD-10-CM

## 2024-02-21 DIAGNOSIS — N40.1 BENIGN PROSTATIC HYPERPLASIA WITH LOWER URINARY TRACT SYMPMS: ICD-10-CM

## 2024-02-21 DIAGNOSIS — N13.8 BENIGN PROSTATIC HYPERPLASIA WITH LOWER URINARY TRACT SYMPMS: ICD-10-CM

## 2024-02-21 DIAGNOSIS — Z87.442 PERSONAL HISTORY OF URINARY CALCULI: ICD-10-CM

## 2024-02-21 PROCEDURE — 99213 OFFICE O/P EST LOW 20 MIN: CPT

## 2024-02-22 RX ORDER — TADALAFIL 20 MG/1
20 TABLET ORAL
Qty: 10 | Refills: 3 | Status: ACTIVE | COMMUNITY
Start: 2024-02-21 | End: 1900-01-01

## 2024-02-29 ENCOUNTER — TRANSCRIPTION ENCOUNTER (OUTPATIENT)
Age: 74
End: 2024-02-29

## 2024-02-29 LAB — TESTOST SERPL-MCNC: 669 NG/DL

## 2024-05-29 NOTE — HISTORY OF PRESENT ILLNESS
[3] : an average pain level of 3/10 [2] : a minimum pain level of 2/10 [6] : a maximum pain level of 6/10 [Dull] : dull [Aching] : aching [Throbbing] : throbbing [Transitioning] : transitioning [Bending] : bending [FreeTextEntry1] : radha is a 73 year old male with pmhx of IBS, HLD, glaucoma, and MARCIA, with back pain from a displaced left 9th and nondisplaced left 8th rib fractures which was caused from a ground level fall at home on 12/07/23. At last visit, patient received a Left T8 and T9 Intercostal Nerve Block on 01/12/24. Since then, patient reports significant improvement in Left rib and back pain, which is now rates a 2/10. He is s/p two thoracic medial branch blocks with significant improvement in pain, approximately >80%.   He presents today with right sided "rib" pain after leaning on a counter at a grocery store. He went to urgent care and obtained XRs which were unremarkable. He says he has used lidocaine patches which have not provided much relief. He reports a history of shingles in the past. Patient denies any new numbness, tingling, weakness, balance issues, bowel incontinence, bladder incontinence, or saddle anesthesia. [FreeTextEntry7] : Right sided rib pain [de-identified] : Itchiness

## 2024-05-29 NOTE — ASSESSMENT
[FreeTextEntry1] : 73 year old male with history of MARCIA, HLD and IBS presents after GLF with fracture to left 8th and 9th ribs with subsequent pain to midback that improved after Left T8 and T9 intercostal nerve block done on 01/12/24. Patient reports improvement of left sided back pain, although he began to feel left sided thoracic back pain on the lateral aspect of this thoracic spine. He is s/p left intercostal nerve blocks with significant improvement.  Presents today with acute onset right sided rib pain with maculopapular rash on exam in a dermatomal distribution that  extends to the right upper quadrant.   Plan - Recommend dermatology evaluation and treatment likely for zoster  - Follow up after completion of treatment to address any residual pain

## 2024-05-29 NOTE — PHYSICAL EXAM
[Normal muscle bulk without asymmetry] : normal muscle bulk without asymmetry [Normal Spine curvature] : normal spine curvature [Normal] : Gait: normal [LE] : Sensory: Intact in bilateral lower extremities [Spinous Process Tenderness] : no spinous process tenderness [Facet Tenderness] : no facet tenderness [de-identified] : Raised macular rash in a dermatomal distribution on the right lower back and right upper quadrant of the abdomen.

## 2024-05-31 ENCOUNTER — RX RENEWAL (OUTPATIENT)
Age: 74
End: 2024-05-31

## 2024-06-29 ENCOUNTER — RX RENEWAL (OUTPATIENT)
Age: 74
End: 2024-06-29

## 2024-08-21 ENCOUNTER — APPOINTMENT (OUTPATIENT)
Dept: UROLOGY | Facility: CLINIC | Age: 74
End: 2024-08-21

## 2024-08-21 VITALS
DIASTOLIC BLOOD PRESSURE: 56 MMHG | SYSTOLIC BLOOD PRESSURE: 96 MMHG | HEART RATE: 80 BPM | HEIGHT: 64 IN | WEIGHT: 120 LBS | BODY MASS INDEX: 20.49 KG/M2 | TEMPERATURE: 97.6 F

## 2024-08-21 DIAGNOSIS — N52.9 MALE ERECTILE DYSFUNCTION, UNSPECIFIED: ICD-10-CM

## 2024-08-21 DIAGNOSIS — N13.8 BENIGN PROSTATIC HYPERPLASIA WITH LOWER URINARY TRACT SYMPMS: ICD-10-CM

## 2024-08-21 DIAGNOSIS — N40.1 BENIGN PROSTATIC HYPERPLASIA WITH LOWER URINARY TRACT SYMPMS: ICD-10-CM

## 2024-08-21 PROCEDURE — 51798 US URINE CAPACITY MEASURE: CPT

## 2024-08-21 PROCEDURE — G2211 COMPLEX E/M VISIT ADD ON: CPT

## 2024-08-21 PROCEDURE — 99214 OFFICE O/P EST MOD 30 MIN: CPT

## 2024-08-23 LAB
APPEARANCE: CLEAR
BACTERIA UR CULT: NORMAL
BACTERIA: NEGATIVE /HPF
BILIRUBIN URINE: NEGATIVE
BLOOD URINE: NEGATIVE
CAST: 0 /LPF
COLOR: YELLOW
EPITHELIAL CELLS: 0 /HPF
GLUCOSE QUALITATIVE U: NEGATIVE MG/DL
KETONES URINE: NEGATIVE MG/DL
LEUKOCYTE ESTERASE URINE: NEGATIVE
MICROSCOPIC-UA: NORMAL
NITRITE URINE: NEGATIVE
PH URINE: 6
PROTEIN URINE: NEGATIVE MG/DL
RED BLOOD CELLS URINE: 0 /HPF
SPECIFIC GRAVITY URINE: 1.01
UROBILINOGEN URINE: 0.2 MG/DL
WHITE BLOOD CELLS URINE: 0 /HPF

## 2024-08-24 NOTE — ASSESSMENT
[FreeTextEntry1] : 73 yo male with BPH/LUTS s/p GLPVP voiding at his baseline on tadalafil daily for LUTS and 20 mg PRN for ED.  He will RTC in the Fall for PSA.

## 2024-08-24 NOTE — HISTORY OF PRESENT ILLNESS
[FreeTextEntry1] : [Dr. Byrd previous notes] This is a 69yo male here for initial consultation of BPH with obstruction. He has had this problem for many years, starting with episode of acute retention in 2004, 3 months after a hernia repair. He thinks his symptoms may be related to mesh. Also seen in ER 12/2018 for right 3mm distal ureteral stone which he thinks passed. At the time, he switched cardura for tamsulosin. H/o incomplete emptying with residuals around 300cc. Also with h/o recurrent Enteroccous infections. Currently without symptoms. CT in 12/2018 also shows bladder stone. Currently no dysuria, hematuria or flank pain. AUASS = 24. PSA around 2.5 last year. His brother had prostate cancer.   10/04/19 Here for f/u. C/o worsening retention symptoms, difficulty urinating. Went to outside ER where urine culture was reportedly normal, no other tests performed. He is concerned about developing obstructive uropathy. No recent creatinine available.   10/23/19 Here for trial of void. Underwent Greenlight PVP last week. Discharged home without Deras but Deras replaced in ER. Also underwent CT scan which shows resolution of small distal ureteral stone. Urine now clear.   11/20/19 Here for f/u. Passed 4mm ureteral stone following surgery. Stone was 90% calcium phosphate. He has had prior stones in the past. Currently feeling well, wants to discuss stone prevention.   12/18/19 Here for f/u. Had 24 hr urine testing which is essentially unremarkable. Feels flow is slowing down since surgery. He stopped tamsulosin.   5/29/20 Here for f/u. Currently feeling well. States he had UTI in April although he only had very mild symptoms. F/u culture negative. Recent PSA = 1.7.   12/21/20 Here for f/u. Feeling well, no complaints. Had renal bladder US in July which was normal, no stones, no hydronephrosis.   6/21/21 Here for f/u. C/o seasonal allergies. Voiding well, no urinary complaints. On Tadalafil 5mg for ED.  12/22/21 Here for f/u. Doing well, no urinary complaints. PSA in June < 1.   6/22/22 Here for f/u. C/o of weak, hesitant stream, and occasional suprapubic discomfort for the past 1 month. Denies hematuria, flank pain, fevers, chills. He is very concerned about possible UTI. Last UTI was in 2020, symptoms were also mild at that time.   8/1/22 Here for f/u. Reports feeling like he can't empty his bladder. last PSA 7/1: 3.76, but was on abx for UTI. Pt reports having recent kidney stones. Renal US done 7/18/22, showing bilateral subcentimeter stones, largest being 0.4cm in left kidney. No hydronephrosis.  11/7/22 Here for f/u. No interval changes. PSA 9/2022 = 1.28  5/31/23 Here for f/u. Recent sinus infection complicated by C.diff from taking Augmentin.   10/30/23 Here for f/u. Doing well, no significant voiding complaints. He does have retrograde ejaculation, not bothersome.   ***************** 2/21/24: Patient returns for follow up.  He is voiding well after GLPVP with Dr. Byrd.  He needs refills on tadalafil.  He has been using it for ED.  He has been using 5 mg prn and has not had as good a response as he would like.  His most recent PSA was WNL.   Of note, he had a CT at [facility] in December after a fall.  The CT showed no stones in either kidney.  There were right sided simple cysts.   PSA (10/31/23) = 1.16  ************** 8/21/24: Patient returns for follow up.  He is voiding at his baseline. He continues to use a PDE5I for ED as needed.  He takes tadalafil 5 mg daily for LUTS.  He is due for PSA in October.  PVR = 129 cc; patient voided again approx. 100 cc.

## 2024-08-24 NOTE — HISTORY OF PRESENT ILLNESS
[FreeTextEntry1] : [Dr. Byrd previous notes] This is a 67yo male here for initial consultation of BPH with obstruction. He has had this problem for many years, starting with episode of acute retention in 2004, 3 months after a hernia repair. He thinks his symptoms may be related to mesh. Also seen in ER 12/2018 for right 3mm distal ureteral stone which he thinks passed. At the time, he switched cardura for tamsulosin. H/o incomplete emptying with residuals around 300cc. Also with h/o recurrent Enteroccous infections. Currently without symptoms. CT in 12/2018 also shows bladder stone. Currently no dysuria, hematuria or flank pain. AUASS = 24. PSA around 2.5 last year. His brother had prostate cancer.   10/04/19 Here for f/u. C/o worsening retention symptoms, difficulty urinating. Went to outside ER where urine culture was reportedly normal, no other tests performed. He is concerned about developing obstructive uropathy. No recent creatinine available.   10/23/19 Here for trial of void. Underwent Greenlight PVP last week. Discharged home without Deras but Deras replaced in ER. Also underwent CT scan which shows resolution of small distal ureteral stone. Urine now clear.   11/20/19 Here for f/u. Passed 4mm ureteral stone following surgery. Stone was 90% calcium phosphate. He has had prior stones in the past. Currently feeling well, wants to discuss stone prevention.   12/18/19 Here for f/u. Had 24 hr urine testing which is essentially unremarkable. Feels flow is slowing down since surgery. He stopped tamsulosin.   5/29/20 Here for f/u. Currently feeling well. States he had UTI in April although he only had very mild symptoms. F/u culture negative. Recent PSA = 1.7.   12/21/20 Here for f/u. Feeling well, no complaints. Had renal bladder US in July which was normal, no stones, no hydronephrosis.   6/21/21 Here for f/u. C/o seasonal allergies. Voiding well, no urinary complaints. On Tadalafil 5mg for ED.  12/22/21 Here for f/u. Doing well, no urinary complaints. PSA in June < 1.   6/22/22 Here for f/u. C/o of weak, hesitant stream, and occasional suprapubic discomfort for the past 1 month. Denies hematuria, flank pain, fevers, chills. He is very concerned about possible UTI. Last UTI was in 2020, symptoms were also mild at that time.   8/1/22 Here for f/u. Reports feeling like he can't empty his bladder. last PSA 7/1: 3.76, but was on abx for UTI. Pt reports having recent kidney stones. Renal US done 7/18/22, showing bilateral subcentimeter stones, largest being 0.4cm in left kidney. No hydronephrosis.  11/7/22 Here for f/u. No interval changes. PSA 9/2022 = 1.28  5/31/23 Here for f/u. Recent sinus infection complicated by C.diff from taking Augmentin.   10/30/23 Here for f/u. Doing well, no significant voiding complaints. He does have retrograde ejaculation, not bothersome.   ***************** 2/21/24: Patient returns for follow up.  He is voiding well after GLPVP with Dr. Byrd.  He needs refills on tadalafil.  He has been using it for ED.  He has been using 5 mg prn and has not had as good a response as he would like.  His most recent PSA was WNL.   Of note, he had a CT at [facility] in December after a fall.  The CT showed no stones in either kidney.  There were right sided simple cysts.   PSA (10/31/23) = 1.16  ************** 8/21/24: Patient returns for follow up.  He is voiding at his baseline. He continues to use a PDE5I for ED as needed.  He takes tadalafil 5 mg daily for LUTS.  He is due for PSA in October.  PVR = 129 cc; patient voided again approx. 100 cc.

## 2024-09-25 ENCOUNTER — LABORATORY RESULT (OUTPATIENT)
Age: 74
End: 2024-09-25

## 2024-10-01 ENCOUNTER — NON-APPOINTMENT (OUTPATIENT)
Age: 74
End: 2024-10-01

## 2024-10-09 ENCOUNTER — APPOINTMENT (OUTPATIENT)
Dept: OTOLARYNGOLOGY | Facility: CLINIC | Age: 74
End: 2024-10-09

## 2024-10-21 ENCOUNTER — APPOINTMENT (OUTPATIENT)
Dept: UROLOGY | Facility: CLINIC | Age: 74
End: 2024-10-21

## 2024-10-24 ENCOUNTER — APPOINTMENT (OUTPATIENT)
Dept: OTOLARYNGOLOGY | Facility: CLINIC | Age: 74
End: 2024-10-24

## 2024-11-01 NOTE — ED PROVIDER NOTE - NS ED MD DISPO DISCHARGE
[TextEntry] : Except as noted above... Constitutional: The patient denied headache, fatigue, fever, sweats, loss of appetite or chills Eyes: The patient denied double vision, eye pain, eye discharge, red eyes or itchy eyes ENT: The patient denied ear pain, ear discharge, nasal congestion, nasal discharge, sore throat, enlarged tonsils, hoarseness, neck pain or neck swelling Cardiovascular: The patient denied chest pain, chest discomfort, dizziness, palpitations, fainting  or leg cramps Respiratory: The patient denied shortness of breath, cough, coughing up blood, wheezing, chest congestion or mucous production GI: The patient denied weight gain, weight loss, abdominal pain, nausea, vomiting, diarrhea, constipation, black stools or bloody stools : The patient denied pain on urination, burning with urination, frequent urination or blood in the urine Skin: The patient denied rashes, redness or swelling Neurologic: The patient denied headache, stiff neck, weakness, numbness, difficulty speaking, unsteadiness or numbness/tingling in feet Psychiatric: The patient denied hallucinations, agitation or disorientation Endocrine: The patient denied excessive thirst, excessive urination, cold intolerance or heat intolerance Hematologic: The patient denied easy bruisability or pallor Allergic/Immunologic: The patient denied runny nose, recurrent infections, hives or pruritis Musculoskeletal: 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  The patient uses nonsteroidal anti-inflammatories and tramadol for the pain Extremities: The patient denied clubbing, cyanosis or lower extremity swelling
Home

## 2024-12-12 ENCOUNTER — APPOINTMENT (OUTPATIENT)
Dept: PAIN MANAGEMENT | Facility: CLINIC | Age: 74
End: 2024-12-12

## 2025-01-26 ENCOUNTER — EMERGENCY (EMERGENCY)
Facility: HOSPITAL | Age: 75
LOS: 1 days | Discharge: HOME | End: 2025-01-26
Admitting: EMERGENCY MEDICINE
Payer: MEDICARE

## 2025-01-26 VITALS
TEMPERATURE: 98 F | RESPIRATION RATE: 16 BRPM | DIASTOLIC BLOOD PRESSURE: 83 MMHG | OXYGEN SATURATION: 98 % | HEART RATE: 67 BPM | SYSTOLIC BLOOD PRESSURE: 130 MMHG

## 2025-01-26 VITALS
HEART RATE: 107 BPM | RESPIRATION RATE: 18 BRPM | TEMPERATURE: 98 F | OXYGEN SATURATION: 98 % | WEIGHT: 125 LBS | SYSTOLIC BLOOD PRESSURE: 121 MMHG | DIASTOLIC BLOOD PRESSURE: 84 MMHG | HEIGHT: 65 IN

## 2025-01-26 DIAGNOSIS — Z98.890 OTHER SPECIFIED POSTPROCEDURAL STATES: Chronic | ICD-10-CM

## 2025-01-26 DIAGNOSIS — K08.409 PARTIAL LOSS OF TEETH, UNSPECIFIED CAUSE, UNSPECIFIED CLASS: Chronic | ICD-10-CM

## 2025-01-26 LAB
ALBUMIN SERPL ELPH-MCNC: 3.5 G/DL — SIGNIFICANT CHANGE UP (ref 3.4–5)
ALP SERPL-CCNC: 80 U/L — SIGNIFICANT CHANGE UP (ref 40–120)
ALT FLD-CCNC: 9 U/L — LOW (ref 12–42)
ANION GAP SERPL CALC-SCNC: 7 MMOL/L — LOW (ref 9–16)
APPEARANCE UR: ABNORMAL
AST SERPL-CCNC: 15 U/L — SIGNIFICANT CHANGE UP (ref 15–37)
BASOPHILS # BLD AUTO: 0.03 K/UL — SIGNIFICANT CHANGE UP (ref 0–0.2)
BASOPHILS NFR BLD AUTO: 0.6 % — SIGNIFICANT CHANGE UP (ref 0–2)
BILIRUB SERPL-MCNC: 0.5 MG/DL — SIGNIFICANT CHANGE UP (ref 0.2–1.2)
BILIRUB UR-MCNC: NEGATIVE — SIGNIFICANT CHANGE UP
BUN SERPL-MCNC: 19 MG/DL — SIGNIFICANT CHANGE UP (ref 7–23)
CALCIUM SERPL-MCNC: 9.1 MG/DL — SIGNIFICANT CHANGE UP (ref 8.5–10.5)
CHLORIDE SERPL-SCNC: 104 MMOL/L — SIGNIFICANT CHANGE UP (ref 96–108)
CO2 SERPL-SCNC: 28 MMOL/L — SIGNIFICANT CHANGE UP (ref 22–31)
COLOR SPEC: YELLOW — SIGNIFICANT CHANGE UP
CREAT SERPL-MCNC: 0.78 MG/DL — SIGNIFICANT CHANGE UP (ref 0.5–1.3)
DIFF PNL FLD: NEGATIVE — SIGNIFICANT CHANGE UP
EGFR: 94 ML/MIN/1.73M2 — SIGNIFICANT CHANGE UP
EOSINOPHIL # BLD AUTO: 0.08 K/UL — SIGNIFICANT CHANGE UP (ref 0–0.5)
EOSINOPHIL NFR BLD AUTO: 1.5 % — SIGNIFICANT CHANGE UP (ref 0–6)
GLUCOSE SERPL-MCNC: 122 MG/DL — HIGH (ref 70–99)
GLUCOSE UR QL: NEGATIVE MG/DL — SIGNIFICANT CHANGE UP
HCT VFR BLD CALC: 40.5 % — SIGNIFICANT CHANGE UP (ref 39–50)
HGB BLD-MCNC: 13.2 G/DL — SIGNIFICANT CHANGE UP (ref 13–17)
IMM GRANULOCYTES NFR BLD AUTO: 0.2 % — SIGNIFICANT CHANGE UP (ref 0–0.9)
KETONES UR-MCNC: NEGATIVE MG/DL — SIGNIFICANT CHANGE UP
LEUKOCYTE ESTERASE UR-ACNC: NEGATIVE — SIGNIFICANT CHANGE UP
LYMPHOCYTES # BLD AUTO: 1.03 K/UL — SIGNIFICANT CHANGE UP (ref 1–3.3)
LYMPHOCYTES # BLD AUTO: 19.7 % — SIGNIFICANT CHANGE UP (ref 13–44)
MCHC RBC-ENTMCNC: 29.2 PG — SIGNIFICANT CHANGE UP (ref 27–34)
MCHC RBC-ENTMCNC: 32.6 G/DL — SIGNIFICANT CHANGE UP (ref 32–36)
MCV RBC AUTO: 89.6 FL — SIGNIFICANT CHANGE UP (ref 80–100)
MONOCYTES # BLD AUTO: 0.42 K/UL — SIGNIFICANT CHANGE UP (ref 0–0.9)
MONOCYTES NFR BLD AUTO: 8 % — SIGNIFICANT CHANGE UP (ref 2–14)
NEUTROPHILS # BLD AUTO: 3.65 K/UL — SIGNIFICANT CHANGE UP (ref 1.8–7.4)
NEUTROPHILS NFR BLD AUTO: 70 % — SIGNIFICANT CHANGE UP (ref 43–77)
NITRITE UR-MCNC: NEGATIVE — SIGNIFICANT CHANGE UP
NRBC # BLD: 0 /100 WBCS — SIGNIFICANT CHANGE UP (ref 0–0)
PH UR: 5.5 — SIGNIFICANT CHANGE UP (ref 5–8)
PLATELET # BLD AUTO: 150 K/UL — SIGNIFICANT CHANGE UP (ref 150–400)
POTASSIUM SERPL-MCNC: 4.2 MMOL/L — SIGNIFICANT CHANGE UP (ref 3.5–5.3)
POTASSIUM SERPL-SCNC: 4.2 MMOL/L — SIGNIFICANT CHANGE UP (ref 3.5–5.3)
PROT SERPL-MCNC: 6.9 G/DL — SIGNIFICANT CHANGE UP (ref 6.4–8.2)
PROT UR-MCNC: 30 MG/DL
RBC # BLD: 4.52 M/UL — SIGNIFICANT CHANGE UP (ref 4.2–5.8)
RBC # FLD: 14.7 % — HIGH (ref 10.3–14.5)
SODIUM SERPL-SCNC: 139 MMOL/L — SIGNIFICANT CHANGE UP (ref 132–145)
SP GR SPEC: 1.02 — SIGNIFICANT CHANGE UP (ref 1–1.03)
UROBILINOGEN FLD QL: 1 MG/DL — SIGNIFICANT CHANGE UP (ref 0.2–1)
WBC # BLD: 5.22 K/UL — SIGNIFICANT CHANGE UP (ref 3.8–10.5)
WBC # FLD AUTO: 5.22 K/UL — SIGNIFICANT CHANGE UP (ref 3.8–10.5)

## 2025-01-26 PROCEDURE — 99284 EMERGENCY DEPT VISIT MOD MDM: CPT
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
## 2025-01-26 NOTE — ED ADULT NURSE NOTE - NSFALLUNIVINTERV_ED_ALL_ED
Bed/Stretcher in lowest position, wheels locked, appropriate side rails in place/Call bell, personal items and telephone in reach/Instruct patient to call for assistance before getting out of bed/chair/stretcher/Non-slip footwear applied when patient is off stretcher/[place] to call system/Physically safe environment - no spills, clutter or unnecessary equipment/Purposeful proactive rounding/Room/bathroom lighting operational, light cord in reach

## 2025-01-26 NOTE — ED PROVIDER NOTE - PATIENT PORTAL LINK FT
You can access the FollowMyHealth Patient Portal offered by [facility] by registering at the following website: [email or web address]. By joining [facility]’s FollowMyHealth portal, you will also be able to view your health information using other applications (apps) compatible with our system.

## 2025-01-26 NOTE — ED PROVIDER NOTE - CLINICAL SUMMARY MEDICAL DECISION MAKING FREE TEXT BOX
Patient here stating he has had difficulty urinating since previous night.  Arrived refusing leg bag.  Able to urinate in the emergency room.  Will plan for labs and urine  Patient able to urinate will discharge with instructions follow-up with urology abdomen soft nontender throughout stay    Appearing well and vibrant at time of discharge

## 2025-01-26 NOTE — ED ADULT NURSE REASSESSMENT NOTE - NS ED NURSE REASSESS COMMENT FT1
Pt provide urine sample with approx 40cc of urine. Advised as patient JENNIFER Sales recommendation of mcclellan insertion for retentions. Pt refused mcclellan catheter insertion. Education was given on the risks of urinary retention. Pt then revised his story and stated his last urination was this morning and not last night.  Pt then verbalized understanding of risks and refused. Pt requesting to see UA results first. JENNIFER Sales aware.

## 2025-01-26 NOTE — ED ADULT NURSE NOTE - OBJECTIVE STATEMENT
Pt presents to ED c/o urinary retention since last night. Pt states he can only urinate a few drops. On assessment, abdomen soft, non-distended. Pt denies any pain or discomfort. Endorses hx prostate procedures.

## 2025-01-26 NOTE — ED PROVIDER NOTE - OBJECTIVE STATEMENT
74-year-old male history of prostate hypertrophy now coming in stating he has not been able to fully urinate.  Endorses trouble since the previous night.  Denies nausea, vomiting, fevers, chills.  Patient appears well no acute distress

## 2025-01-27 ENCOUNTER — NON-APPOINTMENT (OUTPATIENT)
Age: 75
End: 2025-01-27

## 2025-01-28 DIAGNOSIS — R33.9 RETENTION OF URINE, UNSPECIFIED: ICD-10-CM

## 2025-01-28 DIAGNOSIS — Z87.438 PERSONAL HISTORY OF OTHER DISEASES OF MALE GENITAL ORGANS: ICD-10-CM

## 2025-01-29 ENCOUNTER — NON-APPOINTMENT (OUTPATIENT)
Age: 75
End: 2025-01-29

## 2025-01-29 ENCOUNTER — APPOINTMENT (OUTPATIENT)
Dept: UROLOGY | Facility: CLINIC | Age: 75
End: 2025-01-29
Payer: MEDICARE

## 2025-01-29 VITALS
DIASTOLIC BLOOD PRESSURE: 74 MMHG | WEIGHT: 120 LBS | BODY MASS INDEX: 20.49 KG/M2 | HEART RATE: 99 BPM | HEIGHT: 64 IN | TEMPERATURE: 97.3 F | SYSTOLIC BLOOD PRESSURE: 112 MMHG

## 2025-01-29 DIAGNOSIS — N52.9 MALE ERECTILE DYSFUNCTION, UNSPECIFIED: ICD-10-CM

## 2025-01-29 PROCEDURE — G2211 COMPLEX E/M VISIT ADD ON: CPT

## 2025-01-29 PROCEDURE — 99214 OFFICE O/P EST MOD 30 MIN: CPT

## 2025-01-29 RX ORDER — AMOXICILLIN AND CLAVULANATE POTASSIUM 500; 125 MG/1; MG/1
500-125 TABLET, FILM COATED ORAL
Qty: 10 | Refills: 0 | Status: ACTIVE | COMMUNITY
Start: 2025-01-29 | End: 1900-01-01

## 2025-01-29 RX ORDER — DIAZEPAM 5 MG/1
5 TABLET ORAL EVERY 8 HOURS
Qty: 10 | Refills: 0 | Status: ACTIVE | COMMUNITY
Start: 2025-01-29 | End: 1900-01-01

## 2025-01-31 ENCOUNTER — APPOINTMENT (OUTPATIENT)
Dept: UROLOGY | Facility: CLINIC | Age: 75
End: 2025-01-31
Payer: MEDICARE

## 2025-01-31 VITALS
TEMPERATURE: 98.3 F | HEART RATE: 118 BPM | DIASTOLIC BLOOD PRESSURE: 93 MMHG | BODY MASS INDEX: 20.49 KG/M2 | SYSTOLIC BLOOD PRESSURE: 159 MMHG | WEIGHT: 120 LBS | HEIGHT: 64 IN

## 2025-01-31 DIAGNOSIS — R33.8 OTHER RETENTION OF URINE: ICD-10-CM

## 2025-01-31 LAB
APPEARANCE: ABNORMAL
BACTERIA UR CULT: NORMAL
BACTERIA: NEGATIVE /HPF
BILIRUBIN URINE: NEGATIVE
BLOOD URINE: ABNORMAL
CAST: 3 /LPF
COLOR: NORMAL
EPITHELIAL CELLS: 7 /HPF
GLUCOSE QUALITATIVE U: NEGATIVE MG/DL
KETONES URINE: ABNORMAL MG/DL
LEUKOCYTE ESTERASE URINE: ABNORMAL
MICROSCOPIC-UA: NORMAL
NITRITE URINE: NEGATIVE
PH URINE: 5.5
PROTEIN URINE: 100 MG/DL
RED BLOOD CELLS URINE: 292 /HPF
REVIEW: NORMAL
SPECIFIC GRAVITY URINE: 1.02
URIC ACID CRYSTALS: PRESENT
UROBILINOGEN URINE: 1 MG/DL
WHITE BLOOD CELLS URINE: 9 /HPF

## 2025-01-31 PROCEDURE — 52000 CYSTOURETHROSCOPY: CPT

## 2025-02-07 ENCOUNTER — APPOINTMENT (OUTPATIENT)
Dept: UROLOGY | Facility: CLINIC | Age: 75
End: 2025-02-07
Payer: MEDICARE

## 2025-02-07 VITALS
HEIGHT: 64 IN | DIASTOLIC BLOOD PRESSURE: 80 MMHG | TEMPERATURE: 97.5 F | SYSTOLIC BLOOD PRESSURE: 129 MMHG | WEIGHT: 120 LBS | HEART RATE: 95 BPM | BODY MASS INDEX: 20.49 KG/M2

## 2025-02-07 DIAGNOSIS — N40.1 BENIGN PROSTATIC HYPERPLASIA WITH LOWER URINARY TRACT SYMPMS: ICD-10-CM

## 2025-02-07 DIAGNOSIS — N13.8 BENIGN PROSTATIC HYPERPLASIA WITH LOWER URINARY TRACT SYMPMS: ICD-10-CM

## 2025-02-07 PROCEDURE — 51798 US URINE CAPACITY MEASURE: CPT

## 2025-02-07 PROCEDURE — 99213 OFFICE O/P EST LOW 20 MIN: CPT

## 2025-02-07 PROCEDURE — G2211 COMPLEX E/M VISIT ADD ON: CPT

## 2025-02-18 ENCOUNTER — APPOINTMENT (OUTPATIENT)
Dept: UROLOGY | Facility: CLINIC | Age: 75
End: 2025-02-18

## 2025-04-21 ENCOUNTER — APPOINTMENT (OUTPATIENT)
Dept: ULTRASOUND IMAGING | Facility: CLINIC | Age: 75
End: 2025-04-21
Payer: MEDICARE

## 2025-04-21 ENCOUNTER — OUTPATIENT (OUTPATIENT)
Dept: OUTPATIENT SERVICES | Facility: HOSPITAL | Age: 75
LOS: 1 days | End: 2025-04-21

## 2025-04-21 DIAGNOSIS — Z98.890 OTHER SPECIFIED POSTPROCEDURAL STATES: Chronic | ICD-10-CM

## 2025-04-21 DIAGNOSIS — K08.409 PARTIAL LOSS OF TEETH, UNSPECIFIED CAUSE, UNSPECIFIED CLASS: Chronic | ICD-10-CM

## 2025-04-21 PROCEDURE — 76700 US EXAM ABDOM COMPLETE: CPT | Mod: 26

## 2025-08-12 ENCOUNTER — APPOINTMENT (OUTPATIENT)
Dept: UROLOGY | Facility: CLINIC | Age: 75
End: 2025-08-12
Payer: MEDICARE

## 2025-08-12 ENCOUNTER — NON-APPOINTMENT (OUTPATIENT)
Age: 75
End: 2025-08-12

## 2025-08-12 VITALS
WEIGHT: 120 LBS | HEIGHT: 64 IN | BODY MASS INDEX: 20.49 KG/M2 | SYSTOLIC BLOOD PRESSURE: 98 MMHG | HEART RATE: 90 BPM | DIASTOLIC BLOOD PRESSURE: 66 MMHG | TEMPERATURE: 97.6 F

## 2025-08-12 DIAGNOSIS — N20.0 CALCULUS OF KIDNEY: ICD-10-CM

## 2025-08-12 DIAGNOSIS — N13.8 BENIGN PROSTATIC HYPERPLASIA WITH LOWER URINARY TRACT SYMPMS: ICD-10-CM

## 2025-08-12 DIAGNOSIS — Z12.5 ENCOUNTER FOR SCREENING FOR MALIGNANT NEOPLASM OF PROSTATE: ICD-10-CM

## 2025-08-12 DIAGNOSIS — R33.9 RETENTION OF URINE, UNSPECIFIED: ICD-10-CM

## 2025-08-12 DIAGNOSIS — N40.1 BENIGN PROSTATIC HYPERPLASIA WITH LOWER URINARY TRACT SYMPMS: ICD-10-CM

## 2025-08-12 PROCEDURE — 51798 US URINE CAPACITY MEASURE: CPT

## 2025-08-12 PROCEDURE — G2211 COMPLEX E/M VISIT ADD ON: CPT

## 2025-08-12 PROCEDURE — 99213 OFFICE O/P EST LOW 20 MIN: CPT

## 2025-08-13 ENCOUNTER — TRANSCRIPTION ENCOUNTER (OUTPATIENT)
Age: 75
End: 2025-08-13

## 2025-08-13 LAB — PSA SERPL-MCNC: 1.1 NG/ML

## 2025-09-09 ENCOUNTER — APPOINTMENT (OUTPATIENT)
Dept: GASTROENTEROLOGY | Facility: CLINIC | Age: 75
End: 2025-09-09
Payer: MEDICARE

## 2025-09-09 VITALS
HEIGHT: 64 IN | DIASTOLIC BLOOD PRESSURE: 70 MMHG | TEMPERATURE: 96.7 F | HEART RATE: 72 BPM | SYSTOLIC BLOOD PRESSURE: 108 MMHG | OXYGEN SATURATION: 98 % | WEIGHT: 123 LBS | BODY MASS INDEX: 21 KG/M2

## 2025-09-09 DIAGNOSIS — R10.11 RIGHT UPPER QUADRANT PAIN: ICD-10-CM

## 2025-09-09 DIAGNOSIS — Z12.11 ENCOUNTER FOR SCREENING FOR MALIGNANT NEOPLASM OF COLON: ICD-10-CM

## 2025-09-09 PROCEDURE — 99204 OFFICE O/P NEW MOD 45 MIN: CPT

## 2025-09-09 RX ORDER — SODIUM SULFATE, POTASSIUM SULFATE AND MAGNESIUM SULFATE 1.6; 3.13; 17.5 G/177ML; G/177ML; G/177ML
17.5-3.13-1.6 SOLUTION ORAL
Qty: 1 | Refills: 0 | Status: ACTIVE | COMMUNITY
Start: 2025-09-09 | End: 1900-01-01

## 2025-09-10 ENCOUNTER — NON-APPOINTMENT (OUTPATIENT)
Age: 75
End: 2025-09-10

## 2025-09-10 LAB
ALBUMIN SERPL ELPH-MCNC: 4 G/DL
ALP BLD-CCNC: 64 U/L
ALT SERPL-CCNC: 10 U/L
ANION GAP SERPL CALC-SCNC: 10 MMOL/L
AST SERPL-CCNC: 23 U/L
BILIRUB SERPL-MCNC: 0.6 MG/DL
BUN SERPL-MCNC: 11 MG/DL
CALCIUM SERPL-MCNC: 8.9 MG/DL
CHLORIDE SERPL-SCNC: 100 MMOL/L
CO2 SERPL-SCNC: 26 MMOL/L
CREAT SERPL-MCNC: 0.73 MG/DL
EGFRCR SERPLBLD CKD-EPI 2021: 95 ML/MIN/1.73M2
GLUCOSE SERPL-MCNC: 109 MG/DL
HCT VFR BLD CALC: 40.2 %
HGB BLD-MCNC: 13.3 G/DL
MCHC RBC-ENTMCNC: 29.8 PG
MCHC RBC-ENTMCNC: 33.1 G/DL
MCV RBC AUTO: 89.9 FL
PLATELET # BLD AUTO: 154 K/UL
POTASSIUM SERPL-SCNC: 4.7 MMOL/L
PROT SERPL-MCNC: 6.5 G/DL
RBC # BLD: 4.47 M/UL
RBC # FLD: 14.8 %
SODIUM SERPL-SCNC: 135 MMOL/L
WBC # FLD AUTO: 4.16 K/UL

## 2025-09-11 ENCOUNTER — APPOINTMENT (OUTPATIENT)
Dept: ULTRASOUND IMAGING | Facility: HOSPITAL | Age: 75
End: 2025-09-11

## 2025-09-11 ENCOUNTER — NON-APPOINTMENT (OUTPATIENT)
Age: 75
End: 2025-09-11